# Patient Record
Sex: FEMALE | Employment: OTHER | URBAN - METROPOLITAN AREA
[De-identification: names, ages, dates, MRNs, and addresses within clinical notes are randomized per-mention and may not be internally consistent; named-entity substitution may affect disease eponyms.]

---

## 2017-04-28 ENCOUNTER — OFFICE VISIT - HEALTHEAST (OUTPATIENT)
Dept: FAMILY MEDICINE | Facility: CLINIC | Age: 32
End: 2017-04-28

## 2017-04-28 DIAGNOSIS — Z31.9 DESIRE FOR PREGNANCY: ICD-10-CM

## 2017-04-28 DIAGNOSIS — F34.1 DYSTHYMIC DISORDER: ICD-10-CM

## 2017-04-28 ASSESSMENT — MIFFLIN-ST. JEOR: SCORE: 1566.11

## 2017-05-23 ENCOUNTER — OFFICE VISIT - HEALTHEAST (OUTPATIENT)
Dept: OBGYN | Facility: CLINIC | Age: 32
End: 2017-05-23

## 2017-05-23 DIAGNOSIS — Z31.49 PROCREATION MANAGEMENT INVESTIGATION AND TESTING: ICD-10-CM

## 2017-06-21 ENCOUNTER — COMMUNICATION - HEALTHEAST (OUTPATIENT)
Dept: FAMILY MEDICINE | Facility: CLINIC | Age: 32
End: 2017-06-21

## 2017-06-21 DIAGNOSIS — F32.A ANXIETY AND DEPRESSION: ICD-10-CM

## 2017-06-21 DIAGNOSIS — F41.9 ANXIETY AND DEPRESSION: ICD-10-CM

## 2017-09-21 ENCOUNTER — COMMUNICATION - HEALTHEAST (OUTPATIENT)
Dept: FAMILY MEDICINE | Facility: CLINIC | Age: 32
End: 2017-09-21

## 2017-09-21 DIAGNOSIS — F32.A ANXIETY AND DEPRESSION: ICD-10-CM

## 2017-09-21 DIAGNOSIS — F41.9 ANXIETY AND DEPRESSION: ICD-10-CM

## 2017-10-19 ENCOUNTER — COMMUNICATION - HEALTHEAST (OUTPATIENT)
Dept: FAMILY MEDICINE | Facility: CLINIC | Age: 32
End: 2017-10-19

## 2017-10-19 DIAGNOSIS — F34.1 DYSTHYMIC DISORDER: ICD-10-CM

## 2018-04-20 ENCOUNTER — COMMUNICATION - HEALTHEAST (OUTPATIENT)
Dept: FAMILY MEDICINE | Facility: CLINIC | Age: 33
End: 2018-04-20

## 2018-04-20 DIAGNOSIS — F34.1 DYSTHYMIC DISORDER: ICD-10-CM

## 2018-07-18 ENCOUNTER — COMMUNICATION - HEALTHEAST (OUTPATIENT)
Dept: FAMILY MEDICINE | Facility: CLINIC | Age: 33
End: 2018-07-18

## 2018-07-18 DIAGNOSIS — F34.1 DYSTHYMIC DISORDER: ICD-10-CM

## 2018-07-24 ENCOUNTER — AMBULATORY - HEALTHEAST (OUTPATIENT)
Dept: FAMILY MEDICINE | Facility: CLINIC | Age: 33
End: 2018-07-24

## 2018-07-24 ENCOUNTER — COMMUNICATION - HEALTHEAST (OUTPATIENT)
Dept: FAMILY MEDICINE | Facility: CLINIC | Age: 33
End: 2018-07-24

## 2018-07-24 DIAGNOSIS — N92.6 IRREGULAR PERIODS: ICD-10-CM

## 2018-08-28 ENCOUNTER — OFFICE VISIT - HEALTHEAST (OUTPATIENT)
Dept: OBGYN | Facility: CLINIC | Age: 33
End: 2018-08-28

## 2018-08-28 DIAGNOSIS — Z31.49 PROCREATION MANAGEMENT INVESTIGATION AND TESTING: ICD-10-CM

## 2018-08-28 DIAGNOSIS — N94.6 DYSMENORRHEA: ICD-10-CM

## 2018-08-28 ASSESSMENT — MIFFLIN-ST. JEOR: SCORE: 1614.87

## 2018-08-29 ENCOUNTER — COMMUNICATION - HEALTHEAST (OUTPATIENT)
Dept: FAMILY MEDICINE | Facility: CLINIC | Age: 33
End: 2018-08-29

## 2018-09-07 ENCOUNTER — AMBULATORY - HEALTHEAST (OUTPATIENT)
Dept: LAB | Facility: CLINIC | Age: 33
End: 2018-09-07

## 2018-09-07 DIAGNOSIS — Z31.49 PROCREATION MANAGEMENT INVESTIGATION AND TESTING: ICD-10-CM

## 2018-09-07 LAB — PROGEST SERPL-MCNC: 7.4 NG/ML

## 2018-09-12 ENCOUNTER — COMMUNICATION - HEALTHEAST (OUTPATIENT)
Dept: OBGYN | Facility: CLINIC | Age: 33
End: 2018-09-12

## 2018-10-03 ENCOUNTER — OFFICE VISIT - HEALTHEAST (OUTPATIENT)
Dept: FAMILY MEDICINE | Facility: CLINIC | Age: 33
End: 2018-10-03

## 2018-10-03 DIAGNOSIS — F34.1 DYSTHYMIC DISORDER: ICD-10-CM

## 2018-10-03 DIAGNOSIS — L72.3 SEBACEOUS CYST: ICD-10-CM

## 2018-10-03 DIAGNOSIS — R63.5 WEIGHT GAIN: ICD-10-CM

## 2018-10-03 DIAGNOSIS — Z00.00 ROUTINE GENERAL MEDICAL EXAMINATION AT A HEALTH CARE FACILITY: ICD-10-CM

## 2018-10-03 LAB
ERYTHROCYTE [DISTWIDTH] IN BLOOD BY AUTOMATED COUNT: 12.3 % (ref 11–14.5)
HCT VFR BLD AUTO: 42.7 % (ref 35–47)
HGB BLD-MCNC: 14.6 G/DL (ref 12–16)
MCH RBC QN AUTO: 30.9 PG (ref 27–34)
MCHC RBC AUTO-ENTMCNC: 34.1 G/DL (ref 32–36)
MCV RBC AUTO: 90 FL (ref 80–100)
PLATELET # BLD AUTO: 263 THOU/UL (ref 140–440)
PMV BLD AUTO: 7.3 FL (ref 7–10)
RBC # BLD AUTO: 4.72 MILL/UL (ref 3.8–5.4)
WBC: 8.3 THOU/UL (ref 4–11)

## 2018-10-03 ASSESSMENT — MIFFLIN-ST. JEOR: SCORE: 1589.36

## 2018-10-04 LAB
ALBUMIN SERPL-MCNC: 4.4 G/DL (ref 3.5–5)
ALP SERPL-CCNC: 62 U/L (ref 45–120)
ALT SERPL W P-5'-P-CCNC: 13 U/L (ref 0–45)
ANION GAP SERPL CALCULATED.3IONS-SCNC: 11 MMOL/L (ref 5–18)
AST SERPL W P-5'-P-CCNC: 12 U/L (ref 0–40)
BILIRUB SERPL-MCNC: 0.6 MG/DL (ref 0–1)
BUN SERPL-MCNC: 11 MG/DL (ref 8–22)
CALCIUM SERPL-MCNC: 10 MG/DL (ref 8.5–10.5)
CHLORIDE BLD-SCNC: 105 MMOL/L (ref 98–107)
CHOLEST SERPL-MCNC: 161 MG/DL
CO2 SERPL-SCNC: 23 MMOL/L (ref 22–31)
CREAT SERPL-MCNC: 0.75 MG/DL (ref 0.6–1.1)
FASTING STATUS PATIENT QL REPORTED: NO
GFR SERPL CREATININE-BSD FRML MDRD: >60 ML/MIN/1.73M2
GLUCOSE BLD-MCNC: 91 MG/DL (ref 70–125)
HDLC SERPL-MCNC: 58 MG/DL
LDLC SERPL CALC-MCNC: 93 MG/DL
POTASSIUM BLD-SCNC: 4.3 MMOL/L (ref 3.5–5)
PROT SERPL-MCNC: 7.7 G/DL (ref 6–8)
SODIUM SERPL-SCNC: 139 MMOL/L (ref 136–145)
TRIGL SERPL-MCNC: 51 MG/DL
TSH SERPL DL<=0.005 MIU/L-ACNC: 1.57 UIU/ML (ref 0.3–5)

## 2018-10-05 LAB
HPV SOURCE: NORMAL
HUMAN PAPILLOMA VIRUS 16 DNA: NEGATIVE
HUMAN PAPILLOMA VIRUS 18 DNA: NEGATIVE
HUMAN PAPILLOMA VIRUS FINAL DIAGNOSIS: NORMAL
HUMAN PAPILLOMA VIRUS OTHER HR: NEGATIVE
SPECIMEN DESCRIPTION: NORMAL

## 2018-10-08 ENCOUNTER — RECORDS - HEALTHEAST (OUTPATIENT)
Dept: ADMINISTRATIVE | Facility: OTHER | Age: 33
End: 2018-10-08

## 2018-10-11 LAB
BKR LAB AP ABNORMAL BLEEDING: YES
BKR LAB AP BIRTH CONTROL/HORMONES: NORMAL
BKR LAB AP CERVICAL APPEARANCE: NORMAL
BKR LAB AP GYN ADEQUACY: NORMAL
BKR LAB AP GYN INTERPRETATION: NORMAL
BKR LAB AP HPV REFLEX: NORMAL
BKR LAB AP LMP: NORMAL
BKR LAB AP PATIENT STATUS: NORMAL
BKR LAB AP PREVIOUS ABNORMAL: NO
BKR LAB AP PREVIOUS NORMAL: 2014
HIGH RISK?: NO
PATH REPORT.COMMENTS IMP SPEC: NORMAL
RESULT FLAG (HE HISTORICAL CONVERSION): NORMAL

## 2018-10-15 ENCOUNTER — COMMUNICATION - HEALTHEAST (OUTPATIENT)
Dept: FAMILY MEDICINE | Facility: CLINIC | Age: 33
End: 2018-10-15

## 2018-10-15 DIAGNOSIS — F34.1 DYSTHYMIC DISORDER: ICD-10-CM

## 2018-11-09 ENCOUNTER — RECORDS - HEALTHEAST (OUTPATIENT)
Dept: ADMINISTRATIVE | Facility: OTHER | Age: 33
End: 2018-11-09

## 2018-11-12 ENCOUNTER — HOSPITAL ENCOUNTER (OUTPATIENT)
Dept: RADIOLOGY | Facility: HOSPITAL | Age: 33
Discharge: HOME | End: 2018-11-12
Attending: OBSTETRICS & GYNECOLOGY | Admitting: RADIOLOGY

## 2018-11-12 DIAGNOSIS — R10.2 PELVIC PAIN: ICD-10-CM

## 2018-11-12 DIAGNOSIS — Z31.69 INFERTILITY COUNSELING: ICD-10-CM

## 2019-01-14 ENCOUNTER — RECORDS - HEALTHEAST (OUTPATIENT)
Dept: ADMINISTRATIVE | Facility: OTHER | Age: 34
End: 2019-01-14

## 2019-01-17 ENCOUNTER — OFFICE VISIT - HEALTHEAST (OUTPATIENT)
Dept: FAMILY MEDICINE | Facility: CLINIC | Age: 34
End: 2019-01-17

## 2019-01-17 DIAGNOSIS — S16.1XXA STRAIN OF NECK MUSCLE, INITIAL ENCOUNTER: ICD-10-CM

## 2019-01-17 DIAGNOSIS — S06.0X0D CONCUSSION WITHOUT LOSS OF CONSCIOUSNESS, SUBSEQUENT ENCOUNTER: ICD-10-CM

## 2019-09-09 ENCOUNTER — OFFICE VISIT - HEALTHEAST (OUTPATIENT)
Dept: FAMILY MEDICINE | Facility: CLINIC | Age: 34
End: 2019-09-09

## 2019-09-09 DIAGNOSIS — Z00.00 ROUTINE GENERAL MEDICAL EXAMINATION AT A HEALTH CARE FACILITY: ICD-10-CM

## 2019-09-09 DIAGNOSIS — D22.9 ATYPICAL NEVI: ICD-10-CM

## 2019-09-09 DIAGNOSIS — F32.A DEPRESSION, UNSPECIFIED DEPRESSION TYPE: ICD-10-CM

## 2019-09-09 DIAGNOSIS — N97.9 INFERTILITY, FEMALE: ICD-10-CM

## 2019-09-09 DIAGNOSIS — F34.1 DYSTHYMIC DISORDER: ICD-10-CM

## 2019-09-09 LAB
ALBUMIN SERPL-MCNC: 4.1 G/DL (ref 3.5–5)
ALP SERPL-CCNC: 58 U/L (ref 45–120)
ALT SERPL W P-5'-P-CCNC: 9 U/L (ref 0–45)
ANION GAP SERPL CALCULATED.3IONS-SCNC: 10 MMOL/L (ref 5–18)
AST SERPL W P-5'-P-CCNC: 12 U/L (ref 0–40)
BILIRUB SERPL-MCNC: 0.7 MG/DL (ref 0–1)
BUN SERPL-MCNC: 12 MG/DL (ref 8–22)
CALCIUM SERPL-MCNC: 9.4 MG/DL (ref 8.5–10.5)
CHLORIDE BLD-SCNC: 106 MMOL/L (ref 98–107)
CHOLEST SERPL-MCNC: 159 MG/DL
CO2 SERPL-SCNC: 24 MMOL/L (ref 22–31)
CREAT SERPL-MCNC: 0.81 MG/DL (ref 0.6–1.1)
ERYTHROCYTE [DISTWIDTH] IN BLOOD BY AUTOMATED COUNT: 12.3 % (ref 11–14.5)
FASTING STATUS PATIENT QL REPORTED: YES
GFR SERPL CREATININE-BSD FRML MDRD: >60 ML/MIN/1.73M2
GLUCOSE BLD-MCNC: 88 MG/DL (ref 70–125)
HCT VFR BLD AUTO: 40.7 % (ref 35–47)
HDLC SERPL-MCNC: 55 MG/DL
HGB BLD-MCNC: 13.8 G/DL (ref 12–16)
LDLC SERPL CALC-MCNC: 88 MG/DL
MCH RBC QN AUTO: 30.5 PG (ref 27–34)
MCHC RBC AUTO-ENTMCNC: 33.9 G/DL (ref 32–36)
MCV RBC AUTO: 90 FL (ref 80–100)
PLATELET # BLD AUTO: 247 THOU/UL (ref 140–440)
PMV BLD AUTO: 7.4 FL (ref 7–10)
POTASSIUM BLD-SCNC: 4.4 MMOL/L (ref 3.5–5)
PROT SERPL-MCNC: 6.8 G/DL (ref 6–8)
RBC # BLD AUTO: 4.52 MILL/UL (ref 3.8–5.4)
SODIUM SERPL-SCNC: 140 MMOL/L (ref 136–145)
TRIGL SERPL-MCNC: 79 MG/DL
TSH SERPL DL<=0.005 MIU/L-ACNC: 1.49 UIU/ML (ref 0.3–5)
WBC: 5.2 THOU/UL (ref 4–11)

## 2019-09-09 ASSESSMENT — ANXIETY QUESTIONNAIRES
GAD7 TOTAL SCORE: 2
6. BECOMING EASILY ANNOYED OR IRRITABLE: SEVERAL DAYS
7. FEELING AFRAID AS IF SOMETHING AWFUL MIGHT HAPPEN: NOT AT ALL
5. BEING SO RESTLESS THAT IT IS HARD TO SIT STILL: NOT AT ALL
IF YOU CHECKED OFF ANY PROBLEMS ON THIS QUESTIONNAIRE, HOW DIFFICULT HAVE THESE PROBLEMS MADE IT FOR YOU TO DO YOUR WORK, TAKE CARE OF THINGS AT HOME, OR GET ALONG WITH OTHER PEOPLE: NOT DIFFICULT AT ALL
4. TROUBLE RELAXING: NOT AT ALL
1. FEELING NERVOUS, ANXIOUS, OR ON EDGE: NOT AT ALL
3. WORRYING TOO MUCH ABOUT DIFFERENT THINGS: SEVERAL DAYS
2. NOT BEING ABLE TO STOP OR CONTROL WORRYING: NOT AT ALL

## 2019-09-09 ASSESSMENT — PATIENT HEALTH QUESTIONNAIRE - PHQ9: SUM OF ALL RESPONSES TO PHQ QUESTIONS 1-9: 2

## 2019-09-09 ASSESSMENT — MIFFLIN-ST. JEOR: SCORE: 1601.04

## 2019-09-10 LAB
25(OH)D3 SERPL-MCNC: 34.7 NG/ML (ref 30–80)
25(OH)D3 SERPL-MCNC: 34.7 NG/ML (ref 30–80)

## 2019-09-19 ENCOUNTER — RECORDS - HEALTHEAST (OUTPATIENT)
Dept: ADMINISTRATIVE | Facility: OTHER | Age: 34
End: 2019-09-19

## 2019-10-03 ENCOUNTER — AMBULATORY - HEALTHEAST (OUTPATIENT)
Dept: NURSING | Facility: CLINIC | Age: 34
End: 2019-10-03

## 2019-10-03 DIAGNOSIS — Z23 NEED FOR VACCINATION: ICD-10-CM

## 2019-10-12 ENCOUNTER — COMMUNICATION - HEALTHEAST (OUTPATIENT)
Dept: FAMILY MEDICINE | Facility: CLINIC | Age: 34
End: 2019-10-12

## 2019-10-12 DIAGNOSIS — F34.1 DYSTHYMIC DISORDER: ICD-10-CM

## 2020-02-22 ENCOUNTER — COMMUNICATION - HEALTHEAST (OUTPATIENT)
Dept: FAMILY MEDICINE | Facility: CLINIC | Age: 35
End: 2020-02-22

## 2020-10-15 ENCOUNTER — COMMUNICATION - HEALTHEAST (OUTPATIENT)
Dept: FAMILY MEDICINE | Facility: CLINIC | Age: 35
End: 2020-10-15

## 2020-10-16 ENCOUNTER — COMMUNICATION - HEALTHEAST (OUTPATIENT)
Dept: FAMILY MEDICINE | Facility: CLINIC | Age: 35
End: 2020-10-16

## 2020-10-16 DIAGNOSIS — F34.1 DYSTHYMIC DISORDER: ICD-10-CM

## 2020-10-17 ENCOUNTER — COMMUNICATION - HEALTHEAST (OUTPATIENT)
Dept: FAMILY MEDICINE | Facility: CLINIC | Age: 35
End: 2020-10-17

## 2020-10-17 DIAGNOSIS — F34.1 DYSTHYMIC DISORDER: ICD-10-CM

## 2020-10-19 RX ORDER — SERTRALINE HYDROCHLORIDE 100 MG/1
100 TABLET, FILM COATED ORAL DAILY
Qty: 90 TABLET | Refills: 3 | Status: SHIPPED | OUTPATIENT
Start: 2020-10-19 | End: 2021-10-11

## 2021-01-12 ENCOUNTER — COMMUNICATION - HEALTHEAST (OUTPATIENT)
Dept: FAMILY MEDICINE | Facility: CLINIC | Age: 36
End: 2021-01-12

## 2021-01-21 ENCOUNTER — RECORDS - HEALTHEAST (OUTPATIENT)
Dept: ADMINISTRATIVE | Facility: OTHER | Age: 36
End: 2021-01-21

## 2021-01-27 ENCOUNTER — RECORDS - HEALTHEAST (OUTPATIENT)
Dept: ADMINISTRATIVE | Facility: OTHER | Age: 36
End: 2021-01-27

## 2021-02-01 ENCOUNTER — OFFICE VISIT - HEALTHEAST (OUTPATIENT)
Dept: FAMILY MEDICINE | Facility: CLINIC | Age: 36
End: 2021-02-01

## 2021-02-01 DIAGNOSIS — D22.62 MELANOCYTIC NEVUS OF LEFT UPPER EXTREMITY: ICD-10-CM

## 2021-02-01 DIAGNOSIS — Z00.00 ROUTINE GENERAL MEDICAL EXAMINATION AT A HEALTH CARE FACILITY: ICD-10-CM

## 2021-02-01 DIAGNOSIS — F34.1 DYSTHYMIC DISORDER: ICD-10-CM

## 2021-02-01 LAB
ALBUMIN SERPL-MCNC: 4.2 G/DL (ref 3.5–5)
ALP SERPL-CCNC: 52 U/L (ref 45–120)
ALT SERPL W P-5'-P-CCNC: <9 U/L (ref 0–45)
ANION GAP SERPL CALCULATED.3IONS-SCNC: 9 MMOL/L (ref 5–18)
AST SERPL W P-5'-P-CCNC: 10 U/L (ref 0–40)
BILIRUB SERPL-MCNC: 1.2 MG/DL (ref 0–1)
BUN SERPL-MCNC: 12 MG/DL (ref 8–22)
CALCIUM SERPL-MCNC: 8.8 MG/DL (ref 8.5–10.5)
CHLORIDE BLD-SCNC: 106 MMOL/L (ref 98–107)
CHOLEST SERPL-MCNC: 142 MG/DL
CO2 SERPL-SCNC: 26 MMOL/L (ref 22–31)
CREAT SERPL-MCNC: 0.81 MG/DL (ref 0.6–1.1)
ERYTHROCYTE [DISTWIDTH] IN BLOOD BY AUTOMATED COUNT: 11.7 % (ref 11–14.5)
FASTING STATUS PATIENT QL REPORTED: YES
GFR SERPL CREATININE-BSD FRML MDRD: >60 ML/MIN/1.73M2
GLUCOSE BLD-MCNC: 86 MG/DL (ref 70–125)
HCT VFR BLD AUTO: 41.3 % (ref 35–47)
HDLC SERPL-MCNC: 51 MG/DL
HGB BLD-MCNC: 13.7 G/DL (ref 12–16)
LDLC SERPL CALC-MCNC: 82 MG/DL
MCH RBC QN AUTO: 30.2 PG (ref 27–34)
MCHC RBC AUTO-ENTMCNC: 33.2 G/DL (ref 32–36)
MCV RBC AUTO: 91 FL (ref 80–100)
PLATELET # BLD AUTO: 219 THOU/UL (ref 140–440)
PMV BLD AUTO: 7 FL (ref 7–10)
POTASSIUM BLD-SCNC: 4.2 MMOL/L (ref 3.5–5)
PROT SERPL-MCNC: 6.5 G/DL (ref 6–8)
RBC # BLD AUTO: 4.55 MILL/UL (ref 3.8–5.4)
SODIUM SERPL-SCNC: 141 MMOL/L (ref 136–145)
TRIGL SERPL-MCNC: 47 MG/DL
TSH SERPL DL<=0.005 MIU/L-ACNC: 1.66 UIU/ML (ref 0.3–5)
WBC: 5.4 THOU/UL (ref 4–11)

## 2021-02-01 RX ORDER — CLINDAMYCIN PHOSPHATE 10 UG/ML
LOTION TOPICAL
Status: SHIPPED | COMMUNITY
Start: 2020-12-09

## 2021-02-01 ASSESSMENT — ANXIETY QUESTIONNAIRES
7. FEELING AFRAID AS IF SOMETHING AWFUL MIGHT HAPPEN: NOT AT ALL
GAD7 TOTAL SCORE: 5
1. FEELING NERVOUS, ANXIOUS, OR ON EDGE: SEVERAL DAYS
2. NOT BEING ABLE TO STOP OR CONTROL WORRYING: SEVERAL DAYS
4. TROUBLE RELAXING: SEVERAL DAYS
3. WORRYING TOO MUCH ABOUT DIFFERENT THINGS: SEVERAL DAYS
6. BECOMING EASILY ANNOYED OR IRRITABLE: SEVERAL DAYS
IF YOU CHECKED OFF ANY PROBLEMS ON THIS QUESTIONNAIRE, HOW DIFFICULT HAVE THESE PROBLEMS MADE IT FOR YOU TO DO YOUR WORK, TAKE CARE OF THINGS AT HOME, OR GET ALONG WITH OTHER PEOPLE: NOT DIFFICULT AT ALL
5. BEING SO RESTLESS THAT IT IS HARD TO SIT STILL: NOT AT ALL

## 2021-02-01 ASSESSMENT — MIFFLIN-ST. JEOR: SCORE: 1474.82

## 2021-02-01 ASSESSMENT — PATIENT HEALTH QUESTIONNAIRE - PHQ9: SUM OF ALL RESPONSES TO PHQ QUESTIONS 1-9: 5

## 2021-02-02 LAB
25(OH)D3 SERPL-MCNC: 46.8 NG/ML (ref 30–80)
25(OH)D3 SERPL-MCNC: 46.8 NG/ML (ref 30–80)
HPV SOURCE: NORMAL
HUMAN PAPILLOMA VIRUS 16 DNA: NEGATIVE
HUMAN PAPILLOMA VIRUS 18 DNA: NEGATIVE
HUMAN PAPILLOMA VIRUS FINAL DIAGNOSIS: NORMAL
HUMAN PAPILLOMA VIRUS OTHER HR: NEGATIVE
SPECIMEN DESCRIPTION: NORMAL

## 2021-02-09 ENCOUNTER — COMMUNICATION - HEALTHEAST (OUTPATIENT)
Dept: FAMILY MEDICINE | Facility: CLINIC | Age: 36
End: 2021-02-09

## 2021-02-26 ENCOUNTER — AMBULATORY - HEALTHEAST (OUTPATIENT)
Dept: OTHER | Facility: CLINIC | Age: 36
End: 2021-02-26

## 2021-04-28 ENCOUNTER — RECORDS - HEALTHEAST (OUTPATIENT)
Dept: ADMINISTRATIVE | Facility: OTHER | Age: 36
End: 2021-04-28

## 2021-05-25 ENCOUNTER — RECORDS - HEALTHEAST (OUTPATIENT)
Dept: ADMINISTRATIVE | Facility: CLINIC | Age: 36
End: 2021-05-25

## 2021-05-26 ENCOUNTER — RECORDS - HEALTHEAST (OUTPATIENT)
Dept: ADMINISTRATIVE | Facility: CLINIC | Age: 36
End: 2021-05-26

## 2021-05-26 ASSESSMENT — PATIENT HEALTH QUESTIONNAIRE - PHQ9: SUM OF ALL RESPONSES TO PHQ QUESTIONS 1-9: 2

## 2021-05-27 ASSESSMENT — PATIENT HEALTH QUESTIONNAIRE - PHQ9: SUM OF ALL RESPONSES TO PHQ QUESTIONS 1-9: 5

## 2021-05-28 ASSESSMENT — ANXIETY QUESTIONNAIRES
GAD7 TOTAL SCORE: 2
GAD7 TOTAL SCORE: 5

## 2021-05-30 VITALS — BODY MASS INDEX: 30.09 KG/M2 | HEIGHT: 66 IN | WEIGHT: 187.25 LBS

## 2021-05-31 VITALS — WEIGHT: 187 LBS | BODY MASS INDEX: 30.18 KG/M2

## 2021-06-01 NOTE — PATIENT INSTRUCTIONS - HE
Recommend 64-96 oz of non-caffenated fluid per day.    Hot pack if feel like ingrown hair on breast.  If any pimple or lesion on breast last 1-2 months, let us know and we will order mammogram and either Dermatology or breast surgeon consult.    For acne-We can order topical Clindamycin if wishes, can send mychart message if wishes script.    Fertility treatment if and when wishes with a specialty clinic.    Monitor cysts on head, if increasing in size or bothering you, we can remove or have you see a surgeon.    Bariatric consult if wishes for non surgical options.    Dermatology consult for full body skin check and to look at and possible biopsy of the lesion on right upper back      Health Maintenance   Topic Date Due     DEPRESSION FOLLOW UP  Discussed today     HIV SCREENING  NA     INFLUENZA VACCINE RULE BASED (1) Will get in a month or so     PREVENTIVE CARE VISIT  Yearly     TD 18+ HE  02/17/2022     PAP SMEAR  Due in 2 years     ADVANCE CARE PLANNING  On file     TDAP ADULT ONE TIME DOSE  Completed     Counseling would be a good idea.  Options below:    [facility]:   [facility] [phone]  [address] [facility], [place] [address]     [facility]: [phone]  [address], [facility] [address]    [facility]: [phone]  [address], [place] [address]    [facility]:  [phone]   [address] [place] [address]    [facility]:  [facility] [phone]   [address] [place] [address], [address]    [place]:  [facility]: [phone]  [facility] [facility]. [address].     [place]:  [facility]  [phone]  [address], [place] [address]    [place]:  [place] [facility] [phone]    [address], [facility]  [address]    [facility] /[phone]   [address] [facility] [place], [address]    [facility] [phone] [address] [facility] [address] [facility], [address]    [facility] [phone] [address]

## 2021-06-01 NOTE — PROGRESS NOTES
Assessment/Plan:  1. Routine general medical examination at a health care facility  Lipid [facility] FASTING   2. Depression, unspecified depression type  Comprehensive Metabolic Panel   3. Depression With Anxiety  HM2(CBC w/o Differential)    Thyroid Cascade    Vitamin D, Total (25-Hydroxy)   4. Atypical nevi  Ambulatory referral to Dermatology   5. Infertility, female       Recommend 64-96 oz of non-caffenated fluid per day.    Hot pack if feel like ingrown hair on breast.  If any pimple or lesion on breast last 1-2 months, let us know and we will order mammogram and either Dermatology or breast surgeon consult.    For acne-We can order topical Clindamycin if wishes, can send OncoPep message if wishes script.    Fertility treatment if and when wishes with a specialty clinic.    Monitor cysts on head, if increasing in size or bothering you, we can remove or have you see a surgeon.    Bariatric consult if wishes for non surgical options.    Dermatology consult for full body skin check and to look at and possible biopsy of the lesion on right upper back    Counseling would be a good idea.  Options given.    Patient is a 34 y.o. female here for physical exam. See health maintenance section below. Labs as ordered.        HPI    Chief Complaint   Patient presents with     Annual Exam     Fasting, no other concerns       Health Maintenance   Topic Date Due     DEPRESSION FOLLOW UP  Discussed today     HIV SCREENING  NA     INFLUENZA VACCINE RULE BASED (1) Will get in a month or so     PREVENTIVE CARE VISIT  Yearly     TD 18+ HE  02/17/2022     PAP SMEAR  Due in 2 years     ADVANCE CARE PLANNING  On file     TDAP ADULT ONE TIME DOSE  Completed        Patient Active Problem List   Diagnosis     Overweight     Depression With Anxiety     Pigmented Nevus     Dysmenorrhea     Sebaceous cyst     Infertility, female     Current Outpatient Medications   Medication Sig     sertraline (ZOLOFT) 100 MG tablet Take 1 tablet (100 mg  total) by mouth daily.     prenatal vitamin iron-folic acid 27mg-0.8mg (PRENATAL S) 27 mg iron- 800 mcg Tab tablet Take 1 tablet by mouth daily.       Patient is a 34 y.o. female presents for a physical exam.  She does have a few concerns.    Lumps on her head-patient was diagnosed with sebaceous cysts on her head last year at her physical.  She does not feel they have changed in size and do not bother her.    Depression and anxiety-she feels this is overall stable but she has had a recent job change.  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  This is exciting but also causing some anxiety for her.  She is wishing to speak with a counselor.  She is currently on sertraline.    Infertility-she and her  have been trying to conceive for 3 years.  They have seen an infertility specialist.  They have not done any intrauterine insemination or in vitro.  She has done a lot of lab work that has been normal.  Her  has had a sperm count that is normal.  She said she had a hysterosalpingogram that was normal.  She does have regular periods.  She does have history of painful menses.  She was told she possibly has endometriosis but they are not recommending an exploratory laparoscopy at this point.  She has used ovulation predictor kits.  She is on a prenatal vitamin.    Acne-patient does have some acne on her chest and back.  She is occasionally now getting some pimples or ingrown hairs on her breasts.  She is wondering if this is normal.  They do seem to go away in the normal timeframe that she would expect her acne to go away.  She has not used any prescription medication for this.  Working on weight loss-patient has been tracking calories keeping them to 1800 and day is what is recommended per her appendectomy.  She exercises 15 to 60 minutes a day plus a lot of yard work kayaking cleaning house etc.  She has lost 5 pounds  "in the last year and hoping to continue with this or possibly lose more than that.    The following portions of the patient's history were reviewed and updated as appropriate: past medical history, past social history, past surgical history and problem list.    Review of Systems  Pertinent items are noted in HPI.  Immunization History   Administered Date(s) Administered     DT (pediatric) 09/15/2006     HPV Quadrivalent 09/19/2008, 11/21/2008, 10/12/2009     Hep A, historic 09/19/2008, 03/17/2009     Hep B, historic 07/28/2000, 09/25/2000, 02/01/2001     Influenza,seasonal,quad inj =/> 6months 11/13/2015, 12/09/2016, 10/03/2018     Td,adult,historic,unspecified 09/15/2006     Tdap 02/17/2012     Recent Results (from the past 240 hour(s))   HM2(CBC w/o Differential)   Result Value Ref Range    WBC 5.2 4.0 - 11.0 thou/uL    RBC 4.52 3.80 - 5.40 mill/uL    Hemoglobin 13.8 12.0 - 16.0 g/dL    Hematocrit 40.7 35.0 - 47.0 %    MCV 90 80 - 100 fL    MCH 30.5 27.0 - 34.0 pg    MCHC 33.9 32.0 - 36.0 g/dL    RDW 12.3 11.0 - 14.5 %    Platelets 247 140 - 440 thou/uL    MPV 7.4 7.0 - 10.0 fL     I have had an Advance Directives discussion with the patient.  Objective:    BP 92/61 (Patient Site: Left Arm, Patient Position: Sitting, Cuff Size: Adult Large)   Pulse 71   Temp 98  F (36.7  C) (Oral)   Ht 5' 6.5\" (1.689 m)   Wt 193 lb 3.2 oz (87.6 kg)   LMP 09/05/2019   SpO2 97%   BMI 30.72 kg/m        General Appearance:    Alert, cooperative, no distress, appears stated age   Head:    Normocephalic, without obvious abnormality, atraumatic   Eyes:    PERRL, conjunctiva/corneas clear, EOM's intact, fundi     benign, both eyes   Ears:    Normal TM's and external ear canals, both ears   Nose:   Nares normal, septum midline, mucosa normal, no drainage    or sinus tenderness   Throat:   Lips, mucosa, and tongue normal; teeth and gums normal   Neck:   Supple, symmetrical, trachea midline, no adenopathy;     thyroid:  no " enlargement/tenderness/nodules; no carotid    bruit or JVD   Back:     Symmetric, no curvature, ROM normal, no CVA tenderness   Lungs:     Clear to auscultation bilaterally, respirations unlabored   Chest Wall:    No tenderness or deformity    Heart:    Regular rate and rhythm, S1 and S2 normal, no murmur, rub   or gallop   Breast Exam:    No tenderness, masses, or nipple abnormality   Abdomen:     Soft, non-tender, bowel sounds active all four quadrants,     no masses, no organomegaly   Genitalia:    Normal female without lesion, discharge or tenderness       Extremities:   Extremities normal, atraumatic, no cyanosis or edema   Pulses:   2+ and symmetric all extremities   Skin:   Skin color, texture, turgor normal, no rashes, some mild acne on her chest, red papule on the left breast consistent with an ingrown hair or acne, 2 mm black macule right upper back, pea-sized soft mobile nodule on the top of the scalp midline about 2 cm posterior from the hairline, pea-sized soft mobile nodule midline top of scalp about 6 cm posterior from hairline   Lymph nodes:   Cervical, supraclavicular, and axillary nodes normal   Neurologic:   CNII-XII intact, normal strength, sensation and reflexes     throughout

## 2021-06-02 VITALS — WEIGHT: 196 LBS | BODY MASS INDEX: 31.64 KG/M2

## 2021-06-02 VITALS — HEIGHT: 66 IN | BODY MASS INDEX: 30.92 KG/M2 | WEIGHT: 192.38 LBS

## 2021-06-02 VITALS — HEIGHT: 66 IN | BODY MASS INDEX: 31.82 KG/M2 | WEIGHT: 198 LBS

## 2021-06-02 NOTE — TELEPHONE ENCOUNTER
Refill Approved    Rx renewed per Medication Renewal Policy. Medication was last renewed on 10/15/2018 w/3 refills.     Last office visit: 09/09/2019 w/ Dr. Paul PCP.    Cindy Maynard, [facility] Triage/Med Refill 10/13/2019     Requested Prescriptions   Pending Prescriptions Disp Refills     sertraline (ZOLOFT) 100 MG tablet [Pharmacy Med Name: SERTRALINE 100MG TABLETS] 90 tablet 0     Sig: TAKE 1 TABLET(100 MG) BY MOUTH DAILY       SSRI Refill Protocol  Passed - 10/12/2019  7:13 AM        Passed - PCP or prescribing provider visit in last year     Last office visit with prescriber/PCP: Visit date not found OR same dept: 1/17/2019 Priyanka Oviedo FNP OR same specialty: 1/17/2019 Priyanka Oviedo FNP  Last physical: 9/9/2019 Last MTM visit: Visit date not found   Next visit within 3 mo: Visit date not found  Next physical within 3 mo: Visit date not found  Prescriber OR PCP: Fatoumata Paul MD  Last diagnosis associated with med order: 1. Depression With Anxiety  - sertraline (ZOLOFT) 100 MG tablet [Pharmacy Med Name: SERTRALINE 100MG TABLETS]; TAKE 1 TABLET(100 MG) BY MOUTH DAILY  Dispense: 90 tablet; Refill: 0    If protocol passes may refill for 12 months if within 3 months of last provider visit (or a total of 15 months).

## 2021-06-03 VITALS
WEIGHT: 193.2 LBS | HEIGHT: 67 IN | OXYGEN SATURATION: 97 % | SYSTOLIC BLOOD PRESSURE: 92 MMHG | DIASTOLIC BLOOD PRESSURE: 61 MMHG | HEART RATE: 71 BPM | TEMPERATURE: 98 F | BODY MASS INDEX: 30.32 KG/M2

## 2021-06-05 VITALS
WEIGHT: 167.13 LBS | HEIGHT: 66 IN | SYSTOLIC BLOOD PRESSURE: 100 MMHG | BODY MASS INDEX: 26.86 KG/M2 | TEMPERATURE: 96.7 F | RESPIRATION RATE: 16 BRPM | HEART RATE: 71 BPM | DIASTOLIC BLOOD PRESSURE: 62 MMHG

## 2021-06-10 NOTE — PROGRESS NOTES
"  ASSESSMENT/PLAN  1. Depression With Anxiety  Patient stable with sertraline.  Will have her continue with current dose and refill was completed today.  We do discuss risks/benefits of continuing prescription with pregnancy and she understands.    2. Desire for pregnancy  Initial labs reassuring.  Patient's partner continues with evaluation with urology.  Patient referred to Dr. Zuñiga for further consultation.  Patient will contact insurance regarding coverage for infertility treatments.  - Ambulatory referral to Obstetrics / Gynecology      Pt states an understanding and agrees to the above plan.          SUBJECTIVE:   Chief Complaint   Patient presents with     Follow-up     f/u depression and fertility     Kimberley Mendoza 31 y.o. female    Current Outpatient Prescriptions   Medication Sig Dispense Refill     prenatal vitamin iron-folic acid 27mg-0.8mg (PRENATAL S) 27 mg iron- 800 mcg Tab tablet Take 1 tablet by mouth daily.       sertraline (ZOLOFT) 100 MG tablet Take 1 tablet (100 mg total) by mouth daily. 90 tablet 1     No current facility-administered medications for this visit.      Allergies: Tetracycline   No LMP recorded.    HPI:   Randee is a 31-year-old female comes in today for follow-up regarding her depression with anxiety.  She is on sertraline at 100 mg and doing well with this.  She reports compliance with her medication and no side effects or concerns.  PHQ 9 is 2, LILIAM 7 is 1 today.  She does request refill of her medication.  Second issue that she brings up is concerned about pregnancy.  She continues to have regular menstrual cycles.  I saw her with her  in December for concerns about pregnancy and at that point they have been attempting pregnancy for 18 months.  We did some initial labs with recommendation to follow-up after her  had evaluation with urology (he has history of testicular cancer and\" low fertility\").  She has been continuing to track her cycles using an jerri and " "they are regular.  She will frequently have spotting the week prior to her menses.  Labs done in December include testosterone, CBC, thyroid, FSH, LSH all of which were normal.  She did not get a prolactin at that time.  No history of pelvic ultrasound.  Pap last done 2014.  She is due for repeat Pap now.  Randee continues on prenatal vitamin daily.    ROS: negative except as per HPI    OBJECTIVE:   The patient appears well, alert, oriented x 3, in no distress.  BP (!) 84/54 (Patient Site: Right Arm, Patient Position: Sitting, Cuff Size: Adult Regular)  Pulse 68  Temp 98.2  F (36.8  C) (Oral)   Resp 16  Ht 5' 6\" (1.676 m)  Wt 187 lb 4 oz (84.9 kg)  BMI 30.22 kg/m2    15 minutes spent face-to-face with patient, greater than 50% of this in counseling regarding the above, making plans for follow-up care, and medication management.    Pt with menses--defer exam/pap at this appt.          "

## 2021-06-10 NOTE — PROGRESS NOTES
CC: The patient is being seen as a consult from Dr Arita secondary to a desire for pregnancy.    HPI: The pt is a 31 y.o. MWF P0 who presents with a desire to conceive.  She is here with her .  She has regular periods about every 28 days.  Since going off OCPs about 2 years ago she has started to get a few days of brown spotting before her period actually starts.  She is not doing ovulation testing, but her jerri for period tracking is giving her about a 5 day window of fertile time.  During that time she does note a change in vaginal discharge to an egg white consistency.  She had day 2 labs consisting of a normal FSH, LH, testosterone free and total, and TSH.  Her  has a history of testicular cancer.  He has had semen analyses that have been abnormal in the past, with the most recent being about 6 months ago.  His counts have been low with increased abnormal forms and decreased motility.  He has a semen analysis coming up and then an appointment with Urology.    Past Medical History:   Diagnosis Date     Anxiety      Depression        Past Surgical History:   Procedure Laterality Date     NO PAST SURGERIES         Patient's   Family History   Problem Relation Age of Onset     Depression Father      Asthma Father      Thyroid disease Paternal Grandmother      Depression Paternal Grandmother      Cancer Paternal Grandfather      leukemia, bladder       Patient   Social History     Social History     Marital status: Single     Spouse name: N/A     Number of children: N/A     Years of education: N/A     Social History Main Topics     Smoking status: Never Smoker     Smokeless tobacco: None     Alcohol use None     Drug use: None     Sexual activity: Yes     Partners: Male     Birth control/ protection: None     Other Topics Concern     None     Social History Narrative       Current Outpatient Prescriptions   Medication Sig Dispense Refill     prenatal vitamin iron-folic acid 27mg-0.8mg (PRENATAL S) 27  mg iron- 800 mcg Tab tablet Take 1 tablet by mouth daily.       sertraline (ZOLOFT) 100 MG tablet Take 1 tablet (100 mg total) by mouth daily. 90 tablet 1     No current facility-administered medications for this visit.        Patient is allergic to tetracycline.    ROS:  12 part ROS is negative aside from those symptoms in the HPI    PE:  BP 90/60 (Patient Site: Left Arm, Patient Position: Sitting, Cuff Size: Adult Regular)  Pulse (!) 58  Wt 187 lb (84.8 kg)  LMP 05/21/2017  SpO2 99%  BMI 30.18 kg/m2          Body mass index is 30.18 kg/(m^2).    General: obese WF, NAD  Psych: normal mood  Neuro: CN I-XII grossly intact  MS: normal gait    Assessment: 31 y.o. MWF P0 with likely male factor infertility.    Plan: Natural history of fertility issues discussed with the patient.  We discussed that more than likely the issue is with her  because of his history.  I did order a progesterone level to complete her lab work up.  Depending on what Urology has to say, we discussed that they may recommend inseminations or Clomid for her .  We also discussed that if inseminations are recommended, that is usually done by [facility].  They may also recommend Clomid for her to really regulate her cycle for ease in timing the inseminations.  That will be determined once the Urology work up is done.  She was counseled on how to time the progesterone level.  She will be notified when the results are back; she was also counseled that to fully interpret the level, I need to know when her period after the progesterone level is drawn occurs.    Approximately 30 minutes were spent with the patient with the majority in counseling.

## 2021-06-12 NOTE — TELEPHONE ENCOUNTER
RN cannot approve Refill Request    RN can NOT refill this medication Protocol failed and NO refill given. Last office visit: Visit date not found Last Physical: 9/9/2019 Last MTM visit: Visit date not found Last visit same specialty: 1/17/2019 Priyanka Oviedo FNP.  Next visit within 3 mo: Visit date not found  Next physical within 3 mo: Visit date not found      Jaleesa Mcdaniels, [facility] Triage/Med Refill 10/18/2020    Requested Prescriptions   Pending Prescriptions Disp Refills     sertraline (ZOLOFT) 100 MG tablet [Pharmacy Med Name: SERTRALINE 100MG TABLETS] 90 tablet 3     Sig: TAKE 1 TABLET(100 MG) BY MOUTH DAILY       SSRI Refill Protocol  Failed - 10/17/2020  3:23 AM        Failed - PCP or prescribing provider visit in last year     Last office visit with prescriber/PCP: Visit date not found OR same dept: Visit date not found OR same specialty: 1/17/2019 Priyanka Oviedo FNP  Last physical: 9/9/2019 Last MTM visit: Visit date not found   Next visit within 3 mo: Visit date not found  Next physical within 3 mo: Visit date not found  Prescriber OR PCP: Fatoumata Paul MD  Last diagnosis associated with med order: 1. Depression With Anxiety  - sertraline (ZOLOFT) 100 MG tablet [Pharmacy Med Name: SERTRALINE 100MG TABLETS]; TAKE 1 TABLET(100 MG) BY MOUTH DAILY  Dispense: 90 tablet; Refill: 3    If protocol passes may refill for 12 months if within 3 months of last provider visit (or a total of 15 months).

## 2021-06-12 NOTE — TELEPHONE ENCOUNTER
RN cannot approve Refill Request    RN can NOT refill this medication PCP messaged that patient is overdue for Office Visit. Last office visit: Visit date not found Last Physical: 9/9/2019 Last MTM visit: Visit date not found Last visit same specialty: 1/17/2019 Priyanka Oviedo FNP.  Next visit within 3 mo: Visit date not found  Next physical within 3 mo: Visit date not found      Lola Casas, [facility] Triage/Med Refill 10/18/2020    Requested Prescriptions   Pending Prescriptions Disp Refills     sertraline (ZOLOFT) 100 MG tablet 90 tablet 3     Sig: Take 1 tablet (100 mg total) by mouth daily.       SSRI Refill Protocol  Failed - 10/16/2020  2:24 PM        Failed - PCP or prescribing provider visit in last year     Last office visit with prescriber/PCP: Visit date not found OR same dept: Visit date not found OR same specialty: 1/17/2019 Priyanka Oviedo FNP  Last physical: 9/9/2019 Last MTM visit: Visit date not found   Next visit within 3 mo: Visit date not found  Next physical within 3 mo: Visit date not found  Prescriber OR PCP: Fatoumata Paul MD  Last diagnosis associated with med order: 1. Depression With Anxiety  - sertraline (ZOLOFT) 100 MG tablet; Take 1 tablet (100 mg total) by mouth daily.  Dispense: 90 tablet; Refill: 3    If protocol passes may refill for 12 months if within 3 months of last provider visit (or a total of 15 months).

## 2021-06-12 NOTE — TELEPHONE ENCOUNTER
Patient state she scheduled appointment with PCP on 02/01/2021 she does not have refills on prescription requesting to send refill .  Refill Request  Did you contact pharmacy: No  Medication name:   Requested Prescriptions     Pending Prescriptions Disp Refills     sertraline (ZOLOFT) 100 MG tablet 90 tablet 3     Sig: Take 1 tablet (100 mg total) by mouth daily.   Who prescribed the medication: Fatoumata Paul MD  Requested Pharmacy: [facility]  Is patient out of medication: No.  3 days left  Patient notified refills processed in 3 business days:  yes  Okay to leave a detailed message: no

## 2021-06-14 NOTE — TELEPHONE ENCOUNTER
Informed patient's  Jordan to bring patient to [facility]'s ED per Dr. Nuno.  Jordan voiced understanding.  Will leave message for Dr. Paul to review tomorrow.

## 2021-06-14 NOTE — PROGRESS NOTES
Assessment/Plan:  1. Routine general medical examination at a health care facility  Gynecologic Cytology (PAP Smear)    Lipid Cascade FASTING   2. Melanocytic nevus of left upper extremity     3. Depression With Anxiety  Comprehensive Metabolic Panel    HM2(CBC w/o Differential)    Thyroid Cascade    Vitamin D, Total (25-Hydroxy)     Sees Dermatology for full body skin checks.    Seeing a couselior, Eri Telleskins    Continue sertraline at current dose.    Great job with healthy diet exercise and weight loss!    Recommend 3 dairy servings a day or calcium with vitamin D twice a day with food.    Mammogram will be at age 41.  Some insurances cover a baseline between 35 and 40 and is not a bad idea to check on that.    Colonoscopy for screening will be at age 45 at any time if you have blood in the stool.  If you do at age 40 if there is a family history of colon cancer or colon polyps.    20 minutes spent with this patient did not discussion regarding her breakdown and depression and anxiety.  This was on top of the time it took to do the history and physical.    Patient is a 35 y.o. female here for physical exam. See health maintenance section below. Labs as ordered.        HPI    Chief Complaint   Patient presents with     Annual Exam     pt is fasting     Depression /  anxiety:  Got a call that grandfather being released from hospital and they needed her to stay with him and take care of him.  When helping her grandfather on hospice, went 5 days without sleep, had a severe breakdown.    She was in charge of all communication for the entire family.  Per the notes in the chart patient was frantic and not doing well.  They did contact her mental health provider who recommended ensure she is safe and getting enough sleep.Per patient, she slept and much better.  Seeing a counselor and on Sertraline 100 mg and felling ok.  No suicidal or homicidal ideation.  No diagnosis of bipolar.     Weight:  Doing Noom and trying to  eat healthy and exercise.    Infertility:  Not wishing to see an infertility clinic.  Testing normal so far.    Melanocytic Nevus:  See Dermatology yearly.    Social:  Starting a new job soon.    Health Maintenance   Topic Date Due     DEPRESSION ACTION PLAN  Today     HEPATITIS C SCREENING  NA     HIV SCREENING  NA     PREVENTIVE CARE VISIT  Today     TD 18+ HE  02/17/2022     PAP SMEAR  Today     HPV TEST  Today     ADVANCE CARE PLANNING  Packet given     HEPATITIS B VACCINES  Completed     INFLUENZA VACCINE RULE BASED  Completed     TDAP ADULT ONE TIME DOSE  Completed     Pneumococcal Vaccine: Pediatrics (0 to 5 Years) and At-Risk Patients (6 to 64 Years)  NA        Patient Active Problem List   Diagnosis     Overweight     Depression With Anxiety     Pigmented Nevus     Dysmenorrhea     Sebaceous cyst     Infertility, female     Current Outpatient Medications   Medication Sig     cholecalciferol, vitamin D3, (VITAMIN D3) 25 mcg (1,000 unit) capsule Take 1,000 Units by mouth daily.     clindamycin (CLEOCIN T) 1 % lotion      metroNIDAZOLE (METROCREAM) 0.75 % cream      sertraline (ZOLOFT) 100 MG tablet Take 1 tablet (100 mg total) by mouth daily.     sertraline (ZOLOFT) 100 MG tablet TAKE 1 TABLET(100 MG) BY MOUTH DAILY       Patient is a 35 y.o. female presents for a physical exam.    The following portions of the patient's history were reviewed and updated as appropriate: past medical history, past social history, past surgical history and problem list.    Review of Systems  Pertinent items are noted in HPI.  Immunization History   Administered Date(s) Administered     DT (pediatric) 09/15/2006     HPV Quadrivalent 09/19/2008, 11/21/2008, 10/12/2009     Hep A, historic 09/19/2008, 03/17/2009     Hep B, historic 07/28/2000, 09/25/2000, 02/01/2001     Influenza, inj, historic,unspecified 10/24/2020     Influenza,seasonal,quad inj =/> 6months 11/13/2015, 12/09/2016, 10/03/2018, 10/03/2019      "Td,adult,historic,unspecified 09/15/2006     Tdap 02/17/2012     No results found for this or any previous visit (from the past 240 hour(s)).  I have had an Advance Directives discussion with the patient.  Objective:    /62 (Patient Site: Left Arm, Patient Position: Sitting, Cuff Size: Adult Regular)   Pulse 71   Temp 96.7  F (35.9  C) (Oral)   Resp 16   Ht 5' 6\" (1.676 m)   Wt 167 lb 2 oz (75.8 kg)   BMI 26.97 kg/m        General Appearance:    Alert, cooperative, no distress, appears stated age   Head:    Normocephalic, without obvious abnormality, atraumatic   Eyes:    PERRL, conjunctiva/corneas clear, EOM's intact, fundi     benign, both eyes   Ears:    Normal TM's and external ear canals, both ears   Nose:   Nares normal, septum midline, mucosa normal, no drainage    or sinus tenderness   Throat:   Lips, mucosa, and tongue normal; teeth and gums normal   Neck:   Supple, symmetrical, trachea midline, no adenopathy;     thyroid:  no enlargement/tenderness/nodules; no carotid    bruit or JVD   Back:     Symmetric, no curvature, ROM normal, no CVA tenderness   Lungs:     Clear to auscultation bilaterally, respirations unlabored   Chest Wall:    No tenderness or deformity    Heart:    Regular rate and rhythm, S1 and S2 normal, no murmur, rub   or gallop   Breast Exam:    No tenderness, masses, or nipple abnormality   Abdomen:     Soft, non-tender, bowel sounds active all four quadrants,     no masses, no organomegaly   Genitalia:    Normal female without lesion, discharge or tenderness   Rectal:    Normal tone, no masses or tenderness; guaiac negative stool   Extremities:   Extremities normal, atraumatic, no cyanosis or edema   Pulses:   2+ and symmetric all extremities   Skin:   Skin color, texture, turgor normal, no rashes or lesions   Lymph nodes:   Cervical, supraclavicular, and axillary nodes normal   Neurologic:   CNII-XII intact, normal strength, sensation and reflexes     throughout          "

## 2021-06-14 NOTE — TELEPHONE ENCOUNTER
Junaid called back states spoke with  who stated ok to wait on ED visit at this time.  Junaid stated that Kimberley is currently believing in previous delusions and is currently resting

## 2021-06-14 NOTE — PATIENT INSTRUCTIONS - HE
Sees Dermatology for full body skin checks.    Seeing a couselior, Eri Kessler    Great job with healthy diet exercise and weight loss!    Recommend 3 dairy servings a day or calcium with vitamin D twice a day with food.    Mammogram will be at age 41.  Some insurances cover a baseline between 35 and 40 and is not a bad idea to check on that.    Colonoscopy for screening will be at age 45 at any time if you have blood in the stool.  If you do at age 40 if there is a family history of colon cancer or colon polyps.      Health Maintenance   Topic Date Due     DEPRESSION ACTION PLAN  Today     HEPATITIS C SCREENING  NA     HIV SCREENING  NA     PREVENTIVE CARE VISIT  Today     TD 18+ HE  02/17/2022     PAP SMEAR  Today     HPV TEST  Today     ADVANCE CARE PLANNING  Packet given     HEPATITIS B VACCINES  Completed     INFLUENZA VACCINE RULE BASED  Completed     TDAP ADULT ONE TIME DOSE  Completed     Pneumococcal Vaccine: Pediatrics (0 to 5 Years) and At-Risk Patients (6 to 64 Years)  NA

## 2021-06-14 NOTE — TELEPHONE ENCOUNTER
"Jordan states the past week patient has been taking care of her grandfather who is on hospice.  Patient has not been not sleeping well and starting to have delusions.  Pt was hysterical yesterday and was adamant she was going to die from a car crash. Pt was able to sleep for 12 hours last night but is still saying random phrases to her  that doesn't make any sense: \"heres a watch you know what to do\". \"I'm going to die.\"  Jordan contacted patient's therapist to try and get patient scheduled.  Pt is currently taking Sertraline 100 mg daily.  Jordan wondering what Dr. Paul's recommendations are.  "

## 2021-06-14 NOTE — TELEPHONE ENCOUNTER
"Cystoscopy  Date/Time: 1/14/2020 8:30 AM  Performed by: Ge Ramon MD  Authorized by: Ge Ramon MD     Consent Done?:  Yes (Written)  Time out: Immediately prior to procedure a "time out" was called to verify the correct patient, procedure, equipment, support staff and site/side marked as required.    Indications: recurrent UTIs and BPH    Position:  Supine  Anesthesia:  Intraurethral instillation  Patient sedated?: No    Preparation: Patient was prepped and draped in usual sterile fashion      Scope type:  Flexible cystoscope  Stent inserted: No    Stent removed: No    External exam normal: Yes    Digital exam performed: No    Urethra normal: Yes    Prostate normal: No          Hyperplasia (Trilobar)(Length (cm):  8)Bladder neck normal: Bladder neck normal   Bladder normal: Yes      Patient tolerance:  Patient tolerated the procedure well with no immediate complications      " Left message for Jordan to follow up and see if they are needing anything else. I do not see an ER visit unless she went somewhere outside our system

## 2021-06-14 NOTE — TELEPHONE ENCOUNTER
I absolutely agree with the emergency room.  Please let me know after the emergency room visit if they are needing a follow-up.  Thank you.

## 2021-06-14 NOTE — TELEPHONE ENCOUNTER
Received a call from patient's spouse, Jordan to to report that patient is was out of sort the last couple days, but didn't really noticed patient's change in behavior until last night. Spouse reported that patient is sleeping/resting right now, but would like to discuss with provider or provider's nurse. He was very brief didn't elaborate much and was kind of whispering so couldn't gather further info. Communicated to him, I'll send the message to the provider's team. He would like a call back at [phone].

## 2021-06-16 PROBLEM — N97.9 INFERTILITY, FEMALE: Status: ACTIVE | Noted: 2019-09-09

## 2021-06-16 PROBLEM — D22.62 MELANOCYTIC NEVUS OF LEFT UPPER EXTREMITY: Status: ACTIVE | Noted: 2021-02-01

## 2021-06-16 PROBLEM — L72.3 SEBACEOUS CYST: Status: ACTIVE | Noted: 2018-10-03

## 2021-06-20 NOTE — PROGRESS NOTES
Assessment/Plan:  1. Routine general medical examination at a health care facility  Gynecologic Cytology (PAP Smear)    Influenza, Seasonal,Quad Inj, 36+ MOS   2. Weight gain  Comprehensive Metabolic Panel    HM2(CBC w/o Differential)    Lipid Cascade    Thyroid [place]   3. Depression With Anxiety     4. Sebaceous cyst       Patient is a 33 y.o. female here for physical exam. See health maintenance section below. Labs as ordered.      Seeing OBGYN on Friday.    Recommend 64-96 oz of non-caffenated fluid per day.    Monitor cysts on head, if increasing in size or bothering you, we can remove or have you see a surgeon.    Dermatology consult if you wish for full body skin check.    Want 0982-3396 calories per day if able or wishing to.    Exercise 30 min most days of the week.    Recommend 3 dairy servings a day.    Monitor pimple/cyst on left neck for changes, we can remove if wish.    HPI    Chief Complaint   Patient presents with     Annual Exam     physical w/ pap     Establish Care       Health Maintenance   Topic Date Due     DEPRESSION FOLLOW UP  Today     ADVANCE DIRECTIVES DISCUSSED WITH PATIENT  On file     INFLUENZA VACCINE RULE BASED (1) Today     PAP SMEAR  Today     TD 18+ HE  02/17/2022     TDAP ADULT ONE TIME DOSE  Completed        Patient Active Problem List   Diagnosis     Overweight     Depression With Anxiety     Pigmented Nevus     Dysmenorrhea     Current Outpatient Prescriptions   Medication Sig     prenatal vitamin iron-folic acid 27mg-0.8mg (PRENATAL S) 27 mg iron- 800 mcg Tab tablet Take 1 tablet by mouth daily.     sertraline (ZOLOFT) 100 MG tablet Take 1 tablet (100 mg total) by mouth daily.       Patient is a 33 y.o. female presents for a physical exam.  Patient is transferring care for primary care.  She has loved to Dr. Arita's care but is a bit worried about access.  He said Dr. Arita knows her very well and she is a little nervous to change positions but willing to do that.  She  and her  have been trying to conceive for 3 years.  This is a very sensitive topic for her.  She is very upset recently when somebody told her she should just try harder.  She recently did see Dr. Zuñiga and had a normal progesterone level.  She will be having a gynecology consult this [facility].  She feels a lot of stressors in her life.  Her cousin recently had surgery for gallstones.  She recently had jury duty.  Her company has grown and she has more work stress.  She is done some counseling in the past but did not feel it was helpful.  She said her  is an .  They have looked into a program called [employer] where they will be set up with a mentor who can talk to her about fertility concerns.  She has had her spouse owns his own business.  He is running for city [place].  She has 2 cysts on the top of her head that she would like checked.  Does not feel they have grown.  They are not bothering her.  She also has a mole on her back she would like checked.  She has not noticed any moles changing size shape color or bleeding.  She also complains about weight gain.  She does not want to hear about diet and exercise.  She said she tries to eat healthy and exercise.  She is not wishing to journal her calories.  She feels her mood is fairly well controlled on the sertraline.  She does complain of a pimple on the left posterior neck that seems to be associated with her menstrual cycle and then goes away and comes back.    The following portions of the patient's history were reviewed and updated as appropriate: past medical history, past social history, past surgical history and problem list.    Review of Systems  Pertinent items are noted in HPI.  Immunization History   Administered Date(s) Administered     DT (pediatric) 09/15/2006     HPV Quadrivalent 09/19/2008, 11/21/2008, 10/12/2009     Hep A, historic 09/19/2008, 03/17/2009     Hep B, historic 07/28/2000, 09/25/2000,  "02/01/2001     Influenza, seasonal,quad inj 36+ mos 11/13/2015, 12/09/2016     Td,adult,historic,unspecified 09/15/2006     Tdap 02/17/2012     No results found for this or any previous visit (from the past 240 hour(s)).  I have had an Advance Directives discussion with the patient.  Objective:    /77 (Patient Site: Left Arm, Patient Position: Sitting, Cuff Size: Adult Regular)  Pulse 82  Temp 98.5  F (36.9  C) (Oral)   Resp 16  Ht 5' 6\" (1.676 m)  Wt 192 lb 6 oz (87.3 kg)  LMP 09/12/2018  BMI 31.05 kg/m2      General Appearance:    Alert, cooperative, no distress, appears stated age   Head:    Normocephalic, without obvious abnormality, atraumatic, she has a pea-sized soft mobile cyst and a 1 cm soft mobile cyst on the anterior crown of her head, they are not painful to palpation   Eyes:    PERRL, conjunctiva/corneas clear, EOM's intact, fundi     benign, both eyes   Ears:    Normal TM's and external ear canals, both ears   Nose:   Nares normal, septum midline, mucosa normal, no drainage    or sinus tenderness   Throat:   Lips, mucosa, and tongue normal; teeth and gums normal   Neck:   Supple, symmetrical, trachea midline, no adenopathy;     thyroid:  no enlargement/tenderness/nodules; no carotid    bruit or JVD   Back:     Symmetric, no curvature, ROM normal, no CVA tenderness   Lungs:     Clear to auscultation bilaterally, respirations unlabored   Chest Wall:    No tenderness or deformity    Heart:    Regular rate and rhythm, S1 and S2 normal, no murmur, rub   or gallop   Breast Exam:    No tenderness, masses, or nipple abnormality   Abdomen:     Soft, non-tender, bowel sounds active all four quadrants,     no masses, no organomegaly   Genitalia:    Normal female without lesion, discharge or tenderness       Extremities:   Extremities normal, atraumatic, no cyanosis or edema   Pulses:   2+ and symmetric all extremities   Skin:   Skin color, texture, turgor normal, no rashes or lesions, fleshy brown " papule on her left mid back proximately 7 mm, red pimple on the left posterior neck, could be a small ingrown hair or sebaceous cyst   Lymph nodes:   Cervical, supraclavicular, and axillary nodes normal   Neurologic:   CNII-XII intact, normal strength, sensation and reflexes     throughout

## 2021-06-20 NOTE — PROGRESS NOTES
"S: The patient is here in follow up of fertility.  She is here with her .  See note from 5/23/17.  She and her  have continued to try to get pregnant without success.  He did have 2 semen analyses, the first with a low total count and low morphology.  The second was done 6 months later on 6/7/17 and showed a normal count.  The morphology wasn't reported.  Her cycles have been about every 26-28 days.  She is getting spotting for 2-5 days before the actual period starts.  The period itself is about 4-5 days long.  Her July cycle started with spotting July 17 then period from July 22-25.  August had spotting from Aug 15-17 then period itself from Aug 18-21.  The first two days are heavy, sometimes with clots, and she has significant pain the first day that improves some the second day and is resolved by day 3.  She had pain like this since she started having periods.  It resolved while she was using NuvaRing.  Since she stopped it about 3 years ago the pain has slowly been worsening.  She has not been testing ovulation recently, but she does note mucus changes around day 12-14.  She did not have the progesterone level done last year.    Outpatient Medications Prior to Visit   Medication Sig Dispense Refill     prenatal vitamin iron-folic acid 27mg-0.8mg (PRENATAL S) 27 mg iron- 800 mcg Tab tablet Take 1 tablet by mouth daily.       sertraline (ZOLOFT) 100 MG tablet Take 1 tablet (100 mg total) by mouth daily. 90 tablet 0     sertraline (ZOLOFT) 50 MG tablet TAKE 1 TABLET BY MOUTH DAILY 90 tablet 2     No facility-administered medications prior to visit.        Patient is allergic to tetracycline.    O:  BP 98/58 (Patient Site: Right Arm, Patient Position: Sitting, Cuff Size: Adult Regular)  Pulse 74  Ht 5' 6\" (1.676 m)  Wt 198 lb (89.8 kg)  LMP 08/18/2018  SpO2 98%  BMI 31.96 kg/m2          Body mass index is 31.96 kg/(m^2).    General: WN/WD WF, NAD    Assessment: 33 y.o. MWF P0 with a desire for " pregnancy and dysmenorrhea.    Plan: We again discussed timing of ovulation and periods.  I again recommended a luteal progesterone.  From when her last period was, she is expecting ovulation on Aug 31.  We discussed that she can try to do ovulation predictor kits over the next couple days or she can watch her mucus to see if her timing is correct.  Then she will make an appointment to get her progesterone level drawn.  She will be notified when the results are back.  We discussed that I will need to know when her next period comes to fully interpret the results.  We also discussed the pain she's having with her periods.  We discussed that it could be consistent with endometriosis, but that the only way to know is by doing a laparoscopy.  We discussed how endometriosis can be related to fertility issues along with pain and heavy periods.  At this time she will get the progesterone done.  In the meantime she will think about laparoscopy and let me know what she'd like to do about that.  She wanted to know if she's up to date on her general health recommendations.  I counseled her that she is due for a Pap smear, but otherwise seems up to date.  I recommended that she make an appointment for a well woman exam with either Dr Arita or one of her partners.    Approximately 25 minutes were spent with the patient with the majority in counseling.

## 2021-06-21 NOTE — LETTER
Letter by Fatoumata Paul MD at      Author: Fatoumata Paul MD Service: -- Author Type: --    Filed:  Encounter Date: 2/1/2021 Status: (Other)                    My Depression Action Plan  Name: Kimberley Mendoza   Date of Birth 1985  Date: 2/1/2021    My Doctor: Fatoumata Paul MD   My Clinic: [address] [facility] [address]  [phone]          GREEN    ZONE   Good Control    What it looks like:     Things are going generally well. You have normal ups and downs. You may even feel depressed from time to time, but bad moods usually last less than a day.   What you need to do:  1. Continue to care for yourself (see self care plan)  2. Check your depression survival kit and update it as needed  3. Follow your physicians recommendations including any medication.  4. Do not stop taking medication unless you consult with your physician first.           YELLOW         ZONE Getting Worse    What it looks like:     Depression is starting to interfere with your life.     It may be hard to get out of bed; you may be starting to isolate yourself from others.    Symptoms of depression are starting to last most all day and this has happened for several days.     You may have suicidal thoughts but they are not constant.   What you need to do:     1. Call your care team. Your response to treatment will improve if you keep your care team informed of your progress. Yellow periods are signs an adjustment may need to be made.     2. Continue your self-care.  Just get dressed and ready for the day.  Don't give yourself time to talk yourself out of it.    3. Talk to someone in your support network.    4. Open up your depression Depression Self-Care Plan / Wellness kit.           RED    ZONE Medical Alert - Get Help    What it looks like:     Depression is seriously interfering with your life.     You may experience these or other symptoms: You cant get out of  bed most days, cant work or engage in other necessary activities, you have trouble taking care of basic hygiene, or basic responsibilities, thoughts of suicide or death that will not go away, self-injurious behavior.     What you need to do:  1. Call your care team and request a same-day appointment. If they are not available (weekends or after hours) call your local crisis line, emergency room or 911.          Depression Self-Care Plan / Wellness Kit    Many people find that medication and therapy are helpful treatments for managing depression. In addition, making small changes to your everyday life can help to boost your mood and improve your wellbeing. Below are some tips for you to consider. Be sure to talk with your medical provider and/or behavioral health consultant if your symptoms are worsening or not improving.     Sleep  Sleep hygiene means all of the habits that support good, restful sleep. It includes maintaining a consistent bedtime and wake time, using your bedroom only for sleeping or sex, and keeping the bedroom dark and free of distractions like a computer, smartphone, or television.     Develop a Healthy Routine  Maintain good hygiene. Get out of bed in the morning, make your bed, brush your teeth, take a shower, and get dressed. Dont spend too much time viewing media that makes you feel stressed. Find time to relax each day.    Exercise  Get some form of exercise every day. This will help reduce pain and release endorphins, the feel good chemicals in your brain. It can be as simple as just going for a walk or doing some gardening, anything that will get you moving.      Diet  Strive to eat healthy foods, including fruits and vegetables. Drink plenty of water. Avoid excessive sugar, caffeine, alcohol, and other mood-altering substances.     Stay Connected with Others  Stay in touch with friends and family members.    Manage Your Mood  Try deep breathing, massage therapy, biofeedback, or  meditation. Take part in fun activities when you can. Try to find something to smile about each day.     Psychotherapy  Be open to working with a therapist if your provider recommends it.     Medication  Be sure to take your medication as prescribed. Most anti-depressants need to be taken every day. It usually takes several weeks for medications to work. Not all medicines work for all people. It is important to follow-up with your provider to make sure you have a treatment plan that is working for you. Do not stop your medication abruptly without first discussing it with your provider.    Crisis Resources   These hotlines are for both adults and children. They and are open 24 hours a day, 7 days a week unless noted otherwise.      National Suicide Prevention Lifeline   0-101-821-TALK (9366)      Crisis Text Line    www.crisistextline.org  Text HOME to 726238 from anywhere in the [place], anytime, about any type of crisis. A live, trained crisis counselor will receive the text and respond quickly.      [place] Lifeline for LGBTQ Youth  A national crisis intervention and suicide lifeline for LGBTQ youth under 25. Provides a safe place to talk without judgement. Call 1-569.597.8845; text START to 959404 or visit www.thetrevorproject.org to talk to a trained counselor.      For [facility] crisis numbers, visit the [facility] website at:  https://[place].gov/dhs/people-we-serve/adults/health-care/mental-health/resources/crisis-contacts.jsp

## 2021-06-23 NOTE — PROGRESS NOTES
ASSESSMENT:  1. Concussion without loss of consciousness, subsequent encounter         PLAN:  No residual symptoms from concussion, continue to monitor.  Iburofen, heat, stretching, if neck pain persists let us know and we will pursue PT referral.  No problem-specific Assessment & Plan notes found for this encounter.      There are no Patient Instructions on file for this visit.    No orders of the defined types were placed in this encounter.    There are no discontinued medications.    No Follow-up on file.    CHIEF COMPLAINT:  Chief Complaint   Patient presents with     Follow-up     pt slipped on the ice on 1/14 and hit her head was seen at the         HISTORY OF PRESENT ILLNESS:  Kimberley is a 33 y.o. female today for follow-up on head injury.  Patient slipped on the [place] on [address] and hit her head.  She is seen at the emergency room right after the jury.  They diagnosed her with mild concussion based on clinical exam and she has felt fine without persistent neurological symptoms afterwards.  She had a slight headache after the initial injury but no continued headache after that.  She had a little bit of brain fog last couple of days but feels like it is improving quite a bit.  No ongoing headache or other blurred vision, tinnitus etc.  Patient also complains today of some neck and upper shoulder tightness and occasional pain.  Depending on positioning, how she is holding her head she will notice more pain or less pain.  She has done a little bit ibuprofen, otherwise no treatments tried.  No tingling down arms or hands.    REVIEW OF SYSTEMS:   Pertinent items are noted in HPI.  All other systems are negative  PFSH:  Reviewed, no changes      TOBACCO USE:  Social History     Tobacco Use   Smoking Status Never Smoker   Smokeless Tobacco Never Used       VITALS:  Vitals:    01/17/19 1144   BP: 94/65   Patient Site: Left Arm   Patient Position: Sitting   Cuff Size: Adult Large   Pulse: 75   Resp: 16   Weight: 196 lb  (88.9 kg)     Wt Readings from Last 3 Encounters:   01/17/19 196 lb (88.9 kg)   10/03/18 192 lb 6 oz (87.3 kg)   08/28/18 198 lb (89.8 kg)       PHYSICAL EXAM:   BP 94/65 (Patient Site: Left Arm, Patient Position: Sitting, Cuff Size: Adult Large)   Pulse 75   Resp 16   Wt 196 lb (88.9 kg)   BMI 31.64 kg/m    General appearance: alert, appears stated age and cooperative  Head: Normocephalic, without obvious abnormality, atraumatic  Eyes: conjunctivae/corneas clear. PERRL, EOM's intact. Fundi benign.  Ears: normal TM's and external ear canals both ears  Nose: Nares normal. Septum midline. Mucosa normal. No drainage or sinus tenderness.  Throat: lips, mucosa, and tongue normal; teeth and gums normal  Neck: no adenopathy, no carotid bruit, no JVD, supple, symmetrical, trachea midline and thyroid not enlarged, symmetric, no tenderness/mass/nodules tenderness upper trapezius to palpation.  Neurologic: Grossly normal    DATA REVIEWED:  Additional History from Old Records Summarized (2): ed records  Decision to Obtain Records (1): 0  Radiology Tests Summarized or Ordered (1): 0  Labs Reviewed or Ordered (1): 0  Medicine Test Summarized or Ordered (1): 0  Independent Review of EKG or X-RAY(2 each): 0    The visit lasted a total of 25 minutes face to face with the patient. Over 50% of the time was spent counseling and educating the patient about plan of care.    MEDICATIONS:  Current Outpatient Medications   Medication Sig Dispense Refill     prenatal vitamin iron-folic acid 27mg-0.8mg (PRENATAL S) 27 mg iron- 800 mcg Tab tablet Take 1 tablet by mouth daily.       sertraline (ZOLOFT) 100 MG tablet Take 1 tablet (100 mg total) by mouth daily. 90 tablet 3     No current facility-administered medications for this visit.        This note has been dictated using voice recognition software. Any grammatical or context distortions are unintentional and inherent to the software

## 2021-06-26 ENCOUNTER — HEALTH MAINTENANCE LETTER (OUTPATIENT)
Age: 36
End: 2021-06-26

## 2021-10-16 ENCOUNTER — HEALTH MAINTENANCE LETTER (OUTPATIENT)
Age: 36
End: 2021-10-16

## 2021-10-22 ENCOUNTER — IMMUNIZATION (OUTPATIENT)
Dept: FAMILY MEDICINE | Facility: CLINIC | Age: 36
End: 2021-10-22
Payer: COMMERCIAL

## 2021-10-22 PROCEDURE — 90471 IMMUNIZATION ADMIN: CPT

## 2021-10-22 PROCEDURE — 90686 IIV4 VACC NO PRSV 0.5 ML IM: CPT

## 2022-01-08 DIAGNOSIS — F34.1 DYSTHYMIC DISORDER: ICD-10-CM

## 2022-01-10 RX ORDER — SERTRALINE HYDROCHLORIDE 100 MG/1
TABLET, FILM COATED ORAL
Qty: 90 TABLET | Refills: 1 | Status: SHIPPED | OUTPATIENT
Start: 2022-01-10 | End: 2022-02-03

## 2022-01-26 ENCOUNTER — TRANSFERRED RECORDS (OUTPATIENT)
Dept: HEALTH INFORMATION MANAGEMENT | Facility: CLINIC | Age: 37
End: 2022-01-26
Payer: COMMERCIAL

## 2022-02-03 ENCOUNTER — OFFICE VISIT (OUTPATIENT)
Dept: FAMILY MEDICINE | Facility: CLINIC | Age: 37
End: 2022-02-03
Payer: COMMERCIAL

## 2022-02-03 VITALS
WEIGHT: 168.38 LBS | BODY MASS INDEX: 27.18 KG/M2 | HEART RATE: 76 BPM | DIASTOLIC BLOOD PRESSURE: 67 MMHG | SYSTOLIC BLOOD PRESSURE: 93 MMHG | TEMPERATURE: 97.9 F | RESPIRATION RATE: 16 BRPM

## 2022-02-03 DIAGNOSIS — Z11.4 SCREENING FOR HIV (HUMAN IMMUNODEFICIENCY VIRUS): ICD-10-CM

## 2022-02-03 DIAGNOSIS — F34.1 DYSTHYMIC DISORDER: ICD-10-CM

## 2022-02-03 DIAGNOSIS — Z11.59 NEED FOR HEPATITIS C SCREENING TEST: ICD-10-CM

## 2022-02-03 DIAGNOSIS — N97.9 INFERTILITY, FEMALE: ICD-10-CM

## 2022-02-03 DIAGNOSIS — Z00.00 ENCOUNTER FOR PREVENTATIVE ADULT HEALTH CARE EXAMINATION: ICD-10-CM

## 2022-02-03 DIAGNOSIS — D22.62 MELANOCYTIC NEVUS OF LEFT UPPER EXTREMITY: Primary | ICD-10-CM

## 2022-02-03 LAB
ALBUMIN SERPL-MCNC: 4.2 G/DL (ref 3.5–5)
ALP SERPL-CCNC: 41 U/L (ref 45–120)
ALT SERPL W P-5'-P-CCNC: 15 U/L (ref 0–45)
ANION GAP SERPL CALCULATED.3IONS-SCNC: 8 MMOL/L (ref 5–18)
AST SERPL W P-5'-P-CCNC: 15 U/L (ref 0–40)
BILIRUB SERPL-MCNC: 0.9 MG/DL (ref 0–1)
BUN SERPL-MCNC: 9 MG/DL (ref 8–22)
CALCIUM SERPL-MCNC: 9.3 MG/DL (ref 8.5–10.5)
CHLORIDE BLD-SCNC: 105 MMOL/L (ref 98–107)
CHOLEST SERPL-MCNC: 145 MG/DL
CO2 SERPL-SCNC: 27 MMOL/L (ref 22–31)
CREAT SERPL-MCNC: 0.81 MG/DL (ref 0.6–1.1)
ERYTHROCYTE [DISTWIDTH] IN BLOOD BY AUTOMATED COUNT: 12.9 % (ref 10–15)
FASTING STATUS PATIENT QL REPORTED: YES
GFR SERPL CREATININE-BSD FRML MDRD: >90 ML/MIN/1.73M2
GLUCOSE BLD-MCNC: 85 MG/DL (ref 70–125)
HCT VFR BLD AUTO: 42.7 % (ref 35–47)
HDLC SERPL-MCNC: 58 MG/DL
HGB BLD-MCNC: 14.2 G/DL (ref 11.7–15.7)
LDLC SERPL CALC-MCNC: 74 MG/DL
MCH RBC QN AUTO: 30.5 PG (ref 26.5–33)
MCHC RBC AUTO-ENTMCNC: 33.3 G/DL (ref 31.5–36.5)
MCV RBC AUTO: 92 FL (ref 78–100)
PLATELET # BLD AUTO: 252 10E3/UL (ref 150–450)
POTASSIUM BLD-SCNC: 4.2 MMOL/L (ref 3.5–5)
PROT SERPL-MCNC: 6.7 G/DL (ref 6–8)
RBC # BLD AUTO: 4.66 10E6/UL (ref 3.8–5.2)
SODIUM SERPL-SCNC: 140 MMOL/L (ref 136–145)
TRIGL SERPL-MCNC: 65 MG/DL
TSH SERPL DL<=0.005 MIU/L-ACNC: 1.6 UIU/ML (ref 0.3–5)
WBC # BLD AUTO: 5.2 10E3/UL (ref 4–11)

## 2022-02-03 PROCEDURE — 99395 PREV VISIT EST AGE 18-39: CPT | Mod: 25 | Performed by: FAMILY MEDICINE

## 2022-02-03 PROCEDURE — 85027 COMPLETE CBC AUTOMATED: CPT | Performed by: FAMILY MEDICINE

## 2022-02-03 PROCEDURE — 82306 VITAMIN D 25 HYDROXY: CPT | Performed by: FAMILY MEDICINE

## 2022-02-03 PROCEDURE — 36415 COLL VENOUS BLD VENIPUNCTURE: CPT | Performed by: FAMILY MEDICINE

## 2022-02-03 PROCEDURE — 80053 COMPREHEN METABOLIC PANEL: CPT | Performed by: FAMILY MEDICINE

## 2022-02-03 PROCEDURE — 90715 TDAP VACCINE 7 YRS/> IM: CPT | Performed by: FAMILY MEDICINE

## 2022-02-03 PROCEDURE — 84443 ASSAY THYROID STIM HORMONE: CPT | Performed by: FAMILY MEDICINE

## 2022-02-03 PROCEDURE — 90471 IMMUNIZATION ADMIN: CPT | Performed by: FAMILY MEDICINE

## 2022-02-03 PROCEDURE — 80061 LIPID PANEL: CPT | Performed by: FAMILY MEDICINE

## 2022-02-03 RX ORDER — SERTRALINE HYDROCHLORIDE 100 MG/1
100 TABLET, FILM COATED ORAL DAILY
Qty: 90 TABLET | Refills: 3 | Status: SHIPPED | OUTPATIENT
Start: 2022-02-03 | End: 2023-02-20

## 2022-02-03 RX ORDER — CHOLECALCIFEROL (VITAMIN D3) 50 MCG
TABLET ORAL
COMMUNITY

## 2022-02-03 RX ORDER — BACLOFEN 20 MG
TABLET ORAL
COMMUNITY

## 2022-02-03 RX ORDER — ADAPALENE GEL USP, 0.3% 3 MG/G
GEL TOPICAL
COMMUNITY
Start: 2021-04-28

## 2022-02-03 ASSESSMENT — PATIENT HEALTH QUESTIONNAIRE - PHQ9: SUM OF ALL RESPONSES TO PHQ QUESTIONS 1-9: 5

## 2022-02-03 NOTE — PROGRESS NOTES
SUBJECTIVE:   CC: Kimberley Mendoza is an 36 year old woman who presents for preventive health visit.       Patient has been advised of split billing requirements and indicates understanding: Yes  Healthy Habits:     Getting at least 3 servings of Calcium per day:  Yes    Bi-annual eye exam:  Yes    Dental care twice a year:  Yes    Sleep apnea or symptoms of sleep apnea:  None    Diet:  Regular (no restrictions)    Frequency of exercise:  2-3 days/week    Duration of exercise:  30-45 minutes    Taking medications regularly:  Yes    Medication side effects:  Not applicable    PHQ-2 Total Score: 2    Additional concerns today:  No    Mental health / Depression and anxiety:  Sees therapist monthly.  Doing ok.  Mood worse with menses. On Sertraline 100 mg daily.    Social:  Traveling to [place] in 2 weeks.  Hoping to go to [place] in Oct.    Infertility: She and her  are not wishing to pursue any work-up for this.  They would be happy with the pregnancy but are fine with not getting pregnant as well.      Health Maintenance   Topic Date Due     ANNUAL REVIEW OF HM ORDERS  Today     HIV SCREENING  NA     HEPATITIS C SCREENING  NA     PHQ-9  Today     PREVENTIVE CARE VISIT  Today     DTAP/TDAP/TD IMMUNIZATION (3 - Td or Tdap) 02/17/2022, today     HPV TEST  Due in 2 years     ADVANCE CARE PLANNING  On file     PAP  Due in 2 years     DEPRESSION ACTION PLAN  Completed     INFLUENZA VACCINE  Completed     HEPATITIS B IMMUNIZATION  Completed     COVID-19 Vaccine  Completed     Pneumococcal Vaccine: Pediatrics (0 to 5 Years) and At-Risk Patients (6 to 64 Years)  Due age 65     IPV IMMUNIZATION  Aged Out     MENINGITIS IMMUNIZATION  Aged Out                 Today's PHQ-2 Score:   PHQ-2 ( 1999 Pfizer) 1/31/2022   Q1: Little interest or pleasure in doing things 1   Q2: Feeling down, depressed or hopeless 1   PHQ-2 Score 2   Q1: Little interest or pleasure in doing things Several days   Q2: Feeling down, depressed or  hopeless Several days   PHQ-2 Score 2       Abuse: Current or Past (Physical, Sexual or Emotional) - No  Do you feel safe in your environment? Yes        Social History     Tobacco Use     Smoking status: Never Smoker     Smokeless tobacco: Never Used   Substance Use Topics     Alcohol use: Not on file         Alcohol Use 1/31/2022   Prescreen: >3 drinks/day or >7 drinks/week? No       Reviewed orders with patient.  Reviewed health maintenance and updated orders accordingly - Yes  Lab work is in process    Breast Cancer Screening:    Breast CA Risk Assessment (FHS-7) 1/31/2022   Do you have a family history of breast, colon, or ovarian cancer? No / Unknown           Pertinent mammograms are reviewed under the imaging tab.    History of abnormal Pap smear: NO - age 30- 65 PAP every 3 years recommended  PAP / HPV Latest Ref Rng & Units 2/1/2021 10/3/2018   PAP Negative for squamous intraepithelial lesion or malignancy. Negative for squamous intraepithelial lesion or malignancy  Electronically signed by Josiane Woodson CT (ASCP) on 2/10/2021 at  2:08 PM   Negative for squamous intraepithelial lesion or malignancy  Electronically signed by Shona Doyle CT (ASCP) on 10/11/2018 at  2:57 PM     HPV16 NEG Negative Negative   HPV18 NEG Negative Negative   HRHPV NEG Negative Negative     Reviewed and updated as needed this visit by clinical staff  Tobacco  Allergies  Meds             Reviewed and updated as needed this visit by Provider                   Review of Systems  CONSTITUTIONAL: NEGATIVE for fever, chills, change in weight  INTEGUMENTARU/SKIN: NEGATIVE for worrisome rashes, moles or lesions  EYES: NEGATIVE for vision changes or irritation  ENT: NEGATIVE for ear, mouth and throat problems  RESP: NEGATIVE for significant cough or SOB  BREAST: NEGATIVE for masses, tenderness or discharge  CV: NEGATIVE for chest pain, palpitations or peripheral edema  GI: NEGATIVE for nausea, abdominal pain, heartburn, or  change in bowel habits  : NEGATIVE for unusual urinary or vaginal symptoms. Periods are regular.  MUSCULOSKELETAL: NEGATIVE for significant arthralgias or myalgia  NEURO: NEGATIVE for weakness, dizziness or paresthesias  PSYCHIATRIC: NEGATIVE for changes in mood or affect     OBJECTIVE:   BP 93/67 (BP Location: Left arm, Patient Position: Sitting, Cuff Size: Adult Regular)   Pulse 76   Temp 97.9  F (36.6  C) (Oral)   Resp 16   Wt 76.4 kg (168 lb 6 oz)   LMP 01/16/2022   BMI 27.18 kg/m    Physical Exam  GENERAL: healthy, alert and no distress  EYES: Eyes grossly normal to inspection, PERRL and conjunctivae and sclerae normal  HENT: ear canals and TM's normal, nose and mouth without ulcers or lesions  NECK: no adenopathy, no asymmetry, masses, or scars and thyroid normal to palpation  RESP: lungs clear to auscultation - no rales, rhonchi or wheezes  BREAST: normal without masses, tenderness or nipple discharge and no palpable axillary masses or adenopathy  CV: regular rate and rhythm, normal S1 S2, no S3 or S4, no murmur, click or rub, no peripheral edema and peripheral pulses strong  ABDOMEN: soft, nontender, no hepatosplenomegaly, no masses and bowel sounds normal   (female): normal female external genitalia, normal urethral meatus, vaginal mucosa pink, moist, well rugated, and normal cervix/adnexa/uterus without masses or discharge  MS: no gross musculoskeletal defects noted, no edema  SKIN: no suspicious lesions or rashes  NEURO: Normal strength and tone, mentation intact and speech normal  PSYCH: mentation appears normal, affect normal/bright    Diagnostic Test Results:  Labs reviewed in Epic    ASSESSMENT/PLAN:       ICD-10-CM    1. Melanocytic nevus of left upper extremity  D22.62    2. Dysthymic disorder  F34.1 sertraline (ZOLOFT) 100 MG tablet     TSH with free T4 reflex     CBC with platelets     Vitamin D Deficiency     Comprehensive metabolic panel     TSH with free T4 reflex     CBC with  "platelets     Vitamin D Deficiency     Comprehensive metabolic panel   3. Screening for HIV (human immunodeficiency virus)  Z11.4    4. Need for hepatitis C screening test  Z11.59    5. Infertility, female  N97.9    6. Encounter for preventative adult health care examination  Z00.00 Lipid Profile     Lipid Profile     Mammogram due at age 40 and yearly. If your insurance will cover a baseline between 35 and 40 let me know I will place an order.    Colonoscopy for screening is at age 45. If there is any family history of colon cancer or colon polyps we start at 40. We will do a colonoscopy anytime if you have blood in your stool or change in bowel habits.    Sees Dermatology routinely.    Seeing a therapist routinely and on sertraline 100 mg daily.    If it anytime you wish a referral to a fertility clinic please let me know.    It would not be a bad idea to be on a multivitamin with folic acid in case you were to become pregnant to prevent spina bifida.    Patient has been advised of split billing requirements and indicates understanding: Yes    COUNSELING:  Reviewed preventive health counseling, as reflected in patient instructions       Regular exercise       Healthy diet/nutrition    Estimated body mass index is 27.18 kg/m  as calculated from the following:    Height as of 2/1/21: 1.676 m (5' 6\").    Weight as of this encounter: 76.4 kg (168 lb 6 oz).    Weight management plan: Discussed healthy diet and exercise guidelines    She reports that she has never smoked. She has never used smokeless tobacco.      Counseling Resources:  ATP IV Guidelines  Pooled Cohorts Equation Calculator  Breast Cancer Risk Calculator  BRCA-Related Cancer Risk Assessment: FHS-7 Tool  FRAX Risk Assessment  ICSI Preventive Guidelines  Dietary Guidelines for Americans, 2010  USDA's MyPlate  ASA Prophylaxis  Lung CA Screening    Fatoumata Paul MD  [facility]  "

## 2022-02-03 NOTE — PATIENT INSTRUCTIONS
Mammogram due at age 40 and yearly. If your insurance will cover a baseline between 35 and 40 let me know I will place an order.    Colonoscopy for screening is at age 45. If there is any family history of colon cancer or colon polyps we start at 40. We will do a colonoscopy anytime if you have blood in your stool or change in bowel habits.    Sees Dermatology routinely.    Seeing a therapist routinely and on sertraline 100 mg daily.    If it anytime you wish a referral to a fertility clinic please let me know.    It would not be a bad idea to be on a multivitamin with folic acid in case you were to become pregnant to prevent spina bifida.    Health Maintenance   Topic Date Due     ANNUAL REVIEW OF HM ORDERS  Today     HIV SCREENING  NA     HEPATITIS C SCREENING  NA     PHQ-9  Today     PREVENTIVE CARE VISIT  Today     DTAP/TDAP/TD IMMUNIZATION (3 - Td or Tdap) 02/17/2022, today     HPV TEST  Due in 2 years     ADVANCE CARE PLANNING  On file     PAP  Due in 2 years     DEPRESSION ACTION PLAN  Completed     INFLUENZA VACCINE  Completed     HEPATITIS B IMMUNIZATION  Completed     COVID-19 Vaccine  Completed     Pneumococcal Vaccine: Pediatrics (0 to 5 Years) and At-Risk Patients (6 to 64 Years)  Due age 65     IPV IMMUNIZATION  Aged Out     MENINGITIS IMMUNIZATION  Aged Out

## 2022-02-04 LAB — DEPRECATED CALCIDIOL+CALCIFEROL SERPL-MC: 43 UG/L (ref 30–80)

## 2022-02-09 ENCOUNTER — TRANSFERRED RECORDS (OUTPATIENT)
Dept: HEALTH INFORMATION MANAGEMENT | Facility: CLINIC | Age: 37
End: 2022-02-09
Payer: COMMERCIAL

## 2022-03-02 ENCOUNTER — TRANSFERRED RECORDS (OUTPATIENT)
Dept: HEALTH INFORMATION MANAGEMENT | Facility: CLINIC | Age: 37
End: 2022-03-02
Payer: COMMERCIAL

## 2022-09-09 ENCOUNTER — TRANSFERRED RECORDS (OUTPATIENT)
Dept: HEALTH INFORMATION MANAGEMENT | Facility: CLINIC | Age: 37
End: 2022-09-09

## 2022-09-25 ENCOUNTER — HEALTH MAINTENANCE LETTER (OUTPATIENT)
Age: 37
End: 2022-09-25

## 2022-11-09 ENCOUNTER — TRANSFERRED RECORDS (OUTPATIENT)
Dept: HEALTH INFORMATION MANAGEMENT | Facility: CLINIC | Age: 37
End: 2022-11-09

## 2022-12-05 ENCOUNTER — TRANSFERRED RECORDS (OUTPATIENT)
Dept: HEALTH INFORMATION MANAGEMENT | Facility: CLINIC | Age: 37
End: 2022-12-05

## 2023-01-05 ENCOUNTER — TRANSFERRED RECORDS (OUTPATIENT)
Dept: HEALTH INFORMATION MANAGEMENT | Facility: CLINIC | Age: 38
End: 2023-01-05

## 2023-02-21 ASSESSMENT — ENCOUNTER SYMPTOMS
WEAKNESS: 0
HEMATURIA: 0
COUGH: 0
HEARTBURN: 0
DYSURIA: 0
SORE THROAT: 0
MYALGIAS: 0
CHILLS: 0
PARESTHESIAS: 0
BREAST MASS: 0
DIZZINESS: 0
FREQUENCY: 0
NERVOUS/ANXIOUS: 1
CONSTIPATION: 0
FEVER: 0
EYE PAIN: 0
JOINT SWELLING: 0
NAUSEA: 0
PALPITATIONS: 0
ABDOMINAL PAIN: 0
ARTHRALGIAS: 0
HEMATOCHEZIA: 0
SHORTNESS OF BREATH: 0
HEADACHES: 0
DIARRHEA: 0

## 2023-02-21 ASSESSMENT — PATIENT HEALTH QUESTIONNAIRE - PHQ9
SUM OF ALL RESPONSES TO PHQ QUESTIONS 1-9: 15
SUM OF ALL RESPONSES TO PHQ QUESTIONS 1-9: 15
10. IF YOU CHECKED OFF ANY PROBLEMS, HOW DIFFICULT HAVE THESE PROBLEMS MADE IT FOR YOU TO DO YOUR WORK, TAKE CARE OF THINGS AT HOME, OR GET ALONG WITH OTHER PEOPLE: SOMEWHAT DIFFICULT

## 2023-02-23 ENCOUNTER — OFFICE VISIT (OUTPATIENT)
Dept: FAMILY MEDICINE | Facility: CLINIC | Age: 38
End: 2023-02-23
Payer: COMMERCIAL

## 2023-02-23 VITALS
RESPIRATION RATE: 16 BRPM | SYSTOLIC BLOOD PRESSURE: 115 MMHG | TEMPERATURE: 98.8 F | HEIGHT: 67 IN | DIASTOLIC BLOOD PRESSURE: 68 MMHG | OXYGEN SATURATION: 100 % | WEIGHT: 181.25 LBS | BODY MASS INDEX: 28.45 KG/M2 | HEART RATE: 83 BPM

## 2023-02-23 DIAGNOSIS — Z11.4 SCREENING FOR HIV (HUMAN IMMUNODEFICIENCY VIRUS): ICD-10-CM

## 2023-02-23 DIAGNOSIS — Z11.59 NEED FOR HEPATITIS C SCREENING TEST: ICD-10-CM

## 2023-02-23 DIAGNOSIS — N94.6 DYSMENORRHEA: ICD-10-CM

## 2023-02-23 DIAGNOSIS — Z00.00 ENCOUNTER FOR PREVENTATIVE ADULT HEALTH CARE EXAMINATION: Primary | ICD-10-CM

## 2023-02-23 DIAGNOSIS — F34.1 DYSTHYMIC DISORDER: ICD-10-CM

## 2023-02-23 DIAGNOSIS — D22.9 COMPOUND NEVUS: ICD-10-CM

## 2023-02-23 LAB
ALBUMIN SERPL BCG-MCNC: 4.4 G/DL (ref 3.5–5.2)
ALP SERPL-CCNC: 49 U/L (ref 35–104)
ALT SERPL W P-5'-P-CCNC: 31 U/L (ref 10–35)
ANION GAP SERPL CALCULATED.3IONS-SCNC: 12 MMOL/L (ref 7–15)
AST SERPL W P-5'-P-CCNC: 19 U/L (ref 10–35)
BILIRUB SERPL-MCNC: 0.6 MG/DL
BUN SERPL-MCNC: 10.2 MG/DL (ref 6–20)
CALCIUM SERPL-MCNC: 9.5 MG/DL (ref 8.6–10)
CHLORIDE SERPL-SCNC: 105 MMOL/L (ref 98–107)
CHOLEST SERPL-MCNC: 167 MG/DL
CREAT SERPL-MCNC: 0.83 MG/DL (ref 0.51–0.95)
DEPRECATED HCO3 PLAS-SCNC: 25 MMOL/L (ref 22–29)
ERYTHROCYTE [DISTWIDTH] IN BLOOD BY AUTOMATED COUNT: 12.9 % (ref 10–15)
GFR SERPL CREATININE-BSD FRML MDRD: >90 ML/MIN/1.73M2
GLUCOSE SERPL-MCNC: 62 MG/DL (ref 70–99)
HCT VFR BLD AUTO: 37.4 % (ref 35–47)
HDLC SERPL-MCNC: 63 MG/DL
HGB BLD-MCNC: 12.9 G/DL (ref 11.7–15.7)
LDLC SERPL CALC-MCNC: 91 MG/DL
MCH RBC QN AUTO: 31.5 PG (ref 26.5–33)
MCHC RBC AUTO-ENTMCNC: 34.5 G/DL (ref 31.5–36.5)
MCV RBC AUTO: 91 FL (ref 78–100)
NONHDLC SERPL-MCNC: 104 MG/DL
PLATELET # BLD AUTO: 270 10E3/UL (ref 150–450)
POTASSIUM SERPL-SCNC: 4.2 MMOL/L (ref 3.4–5.3)
PROT SERPL-MCNC: 6.6 G/DL (ref 6.4–8.3)
RBC # BLD AUTO: 4.09 10E6/UL (ref 3.8–5.2)
SODIUM SERPL-SCNC: 142 MMOL/L (ref 136–145)
TRIGL SERPL-MCNC: 67 MG/DL
TSH SERPL DL<=0.005 MIU/L-ACNC: 1.3 UIU/ML (ref 0.3–4.2)
WBC # BLD AUTO: 5.6 10E3/UL (ref 4–11)

## 2023-02-23 PROCEDURE — 86803 HEPATITIS C AB TEST: CPT | Performed by: FAMILY MEDICINE

## 2023-02-23 PROCEDURE — 80061 LIPID PANEL: CPT | Performed by: FAMILY MEDICINE

## 2023-02-23 PROCEDURE — 85027 COMPLETE CBC AUTOMATED: CPT | Performed by: FAMILY MEDICINE

## 2023-02-23 PROCEDURE — 99395 PREV VISIT EST AGE 18-39: CPT | Performed by: FAMILY MEDICINE

## 2023-02-23 PROCEDURE — 36415 COLL VENOUS BLD VENIPUNCTURE: CPT | Performed by: FAMILY MEDICINE

## 2023-02-23 PROCEDURE — 84443 ASSAY THYROID STIM HORMONE: CPT | Performed by: FAMILY MEDICINE

## 2023-02-23 PROCEDURE — 80053 COMPREHEN METABOLIC PANEL: CPT | Performed by: FAMILY MEDICINE

## 2023-02-23 PROCEDURE — 82306 VITAMIN D 25 HYDROXY: CPT | Performed by: FAMILY MEDICINE

## 2023-02-23 PROCEDURE — 99213 OFFICE O/P EST LOW 20 MIN: CPT | Mod: 25 | Performed by: FAMILY MEDICINE

## 2023-02-23 PROCEDURE — 87389 HIV-1 AG W/HIV-1&-2 AB AG IA: CPT | Performed by: FAMILY MEDICINE

## 2023-02-23 RX ORDER — SERTRALINE HYDROCHLORIDE 100 MG/1
100 TABLET, FILM COATED ORAL DAILY
Qty: 90 TABLET | Refills: 1
Start: 2023-02-23 | End: 2023-08-13

## 2023-02-23 ASSESSMENT — ENCOUNTER SYMPTOMS
DIZZINESS: 0
ABDOMINAL PAIN: 0
SHORTNESS OF BREATH: 0
NERVOUS/ANXIOUS: 1
PALPITATIONS: 0
EYE PAIN: 0
HEADACHES: 0
HEMATOCHEZIA: 0
DIARRHEA: 0
MYALGIAS: 0
SORE THROAT: 0
CONSTIPATION: 0
DYSURIA: 0
BREAST MASS: 0
FREQUENCY: 0
CHILLS: 0
PARESTHESIAS: 0
WEAKNESS: 0
FEVER: 0
COUGH: 0
ARTHRALGIAS: 0
HEMATURIA: 0
NAUSEA: 0
JOINT SWELLING: 0
HEARTBURN: 0

## 2023-02-23 NOTE — PROGRESS NOTES
SUBJECTIVE:   CC: Randee is an 37 year old who presents for preventive health visit.   Patient has been advised of split billing requirements and indicates understanding: Yes  Healthy Habits:     Getting at least 3 servings of Calcium per day:  Yes    Bi-annual eye exam:  Yes    Dental care twice a year:  Yes    Sleep apnea or symptoms of sleep apnea:  None    Diet:  Regular (no restrictions)    Frequency of exercise:  2-3 days/week    Duration of exercise:  15-30 minutes    Taking medications regularly:  Yes    Medication side effects:  Not applicable    PHQ-2 Total Score: 4    Additional concerns today:  Yes    Depression:  Regularly sees her therapist.   started his own business.  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  This is the same time of year that her grandfather passed away.  Feel like her tolerance to mange things is lower.  Has been on Sertraline for several years.  Had been helpful in the past but having more symptoms of anxiety and depression recently.  Does contract against suicide and homicide.    Dysmenorrhea:  Can have severe cramps with her menses. Not wanting birch control now and they may try to conceive in the future. Periods monthly    Social:  Depression on both sides of the family.    Health Maintenance   Topic Date Due     HIV SCREENING  Today     HEPATITIS C SCREENING  Today     ANNUAL REVIEW OF HM ORDERS  Today     YEARLY PREVENTIVE VISIT  Today     PHQ-9  Today     HPV TEST  Next year     ADVANCE CARE PLANNING  Packet given     PAP  Next year     DTAP/TDAP/TD IMMUNIZATION (4 - Td or Tdap) 02/03/2032     DEPRESSION ACTION PLAN  Completed     INFLUENZA VACCINE  Completed     HEPATITIS B IMMUNIZATION  Completed     COVID-19 Vaccine  Completed     Pneumococcal Vaccine: Pediatrics (0 to 5 Years) and At-Risk Patients (6 to 64 Years)  Age 65     IPV IMMUNIZATION  Aged Out     MENINGITIS IMMUNIZATION  Aged Out               Today's PHQ-2 Score:   PHQ-2 ( 1999  [employer]) 2/21/2023   Q1: Little interest or pleasure in doing things 2   Q2: Feeling down, depressed or hopeless 2   PHQ-2 Score 4   Q1: Little interest or pleasure in doing things More than half the days   Q2: Feeling down, depressed or hopeless More than half the days   PHQ-2 Score 4           Social History     Tobacco Use     Smoking status: Never     Smokeless tobacco: Never   Substance Use Topics     Alcohol use: Not on file         Alcohol Use 2/21/2023   Prescreen: >3 drinks/day or >7 drinks/week? No       Reviewed orders with patient.  Reviewed health maintenance and updated orders accordingly - Yes  Lab work is in process    Breast Cancer Screening:    Breast CA Risk Assessment (FHS-7) 1/31/2022   Do you have a family history of breast, colon, or ovarian cancer? No / Unknown           Pertinent mammograms are reviewed under the imaging tab.    History of abnormal Pap smear: NO - age 30- 65 PAP every 3 years recommended  PAP / HPV Latest Ref Rng & Units 2/1/2021 10/3/2018   PAP Negative for squamous intraepithelial lesion or malignancy. Negative for squamous intraepithelial lesion or malignancy  Electronically signed by Josiane Woodson CT (ASCP) on 2/10/2021 at  2:08 PM   Negative for squamous intraepithelial lesion or malignancy  Electronically signed by Shona Doyle CT (ASCP) on 10/11/2018 at  2:57 PM     HPV16 NEG Negative Negative   HPV18 NEG Negative Negative   HRHPV NEG Negative Negative     Reviewed and updated as needed this visit by clinical staff   Tobacco  Allergies  Meds              Reviewed and updated as needed this visit by Provider                     Review of Systems   Constitutional: Negative for chills and fever.   HENT: Negative for congestion, ear pain, hearing loss and sore throat.    Eyes: Negative for pain and visual disturbance.   Respiratory: Negative for cough and shortness of breath.    Cardiovascular: Negative for chest pain, palpitations and peripheral edema.  "  Gastrointestinal: Negative for abdominal pain, constipation, diarrhea, heartburn, hematochezia and nausea.   Breasts:  Negative for tenderness, breast mass and discharge.   Genitourinary: Negative for dysuria, frequency, genital sores, hematuria, pelvic pain, urgency, vaginal bleeding and vaginal discharge.   Musculoskeletal: Negative for arthralgias, joint swelling and myalgias.   Skin: Negative for rash.   Neurological: Negative for dizziness, weakness, headaches and paresthesias.   Psychiatric/Behavioral: Positive for mood changes. The patient is nervous/anxious.           OBJECTIVE:   /68 (BP Location: Left arm, Patient Position: Sitting, Cuff Size: Adult Regular)   Pulse 83   Temp 98.8  F (37.1  C) (Oral)   Resp 16   Ht 1.689 m (5' 6.5\")   Wt 82.2 kg (181 lb 4 oz)   LMP 02/10/2023   SpO2 100%   BMI 28.82 kg/m    Physical Exam  GENERAL: healthy, alert and no distress  EYES: Eyes grossly normal to inspection, PERRL and conjunctivae and sclerae normal  HENT: ear canals and TM's normal, nose and mouth without ulcers or lesions  NECK: no adenopathy, no asymmetry, masses, or scars and thyroid normal to palpation  RESP: lungs clear to auscultation - no rales, rhonchi or wheezes  BREAST: normal without masses, tenderness or nipple discharge and no palpable axillary masses or adenopathy  CV: regular rate and rhythm, normal S1 S2, no S3 or S4, no murmur, click or rub, no peripheral edema and peripheral pulses strong  ABDOMEN: soft, nontender, no hepatosplenomegaly, no masses and bowel sounds normal   (female): normal female external genitalia, normal urethral meatus, vaginal mucosa pink, moist, well rugated, and normal cervix/adnexa/uterus without masses or discharge  MS: no gross musculoskeletal defects noted, no edema  SKIN: no suspicious lesions or rashes  NEURO: Normal strength and tone, mentation intact and speech normal  PSYCH: mentation appears normal, affect normal/bright    Diagnostic Test " Results:  Labs reviewed in Epic    ASSESSMENT/PLAN:       ICD-10-CM    1. Encounter for preventative adult health care examination  Z00.00 Lipid Profile     Lipid Profile      2. Screening for HIV (human immunodeficiency virus)  Z11.4 HIV Antigen Antibody Combo     HIV Antigen Antibody Combo      3. Need for hepatitis C screening test  Z11.59 Hepatitis C Screen Reflex to HCV RNA Quant and Genotype     Hepatitis C Screen Reflex to HCV RNA Quant and Genotype      4. Depression With Anxiety  F34.1 sertraline (ZOLOFT) 50 MG tablet      5. 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 Dysmenorrhea  N94.6 CBC with platelets     TSH with free T4 reflex     Vitamin D Deficiency     CBC with platelets     TSH with free T4 reflex     Vitamin D Deficiency        Mammograms recommended yearly at age 40.  If insurance will cover a baseline between 35 and 40 that is not a bad idea.  If they do then you can call [phone] to set that up at our breast center.    Screening colonoscopy recommended at age 45.  We would do it at any time if you had blood in your stool or change in bowel habits.  We would also do it at age 40 if you had a family history of colon cancer or the adenomatous colon polyps.    I am glad you are seeing a therapist.    Increase Sertraline to 150 mg by adding a 50 mg tab to the 100 mg tab.     I am glad  you see Dermatology.    For your dysmenorrhea when you start the spotting you can start taking ibuprofen 800 mg 3 times a day with food and continue that through the heavy days of your period and that may help with the cramping.    Normal cycles, anywhere from every 26 to 40 days.    Patient has been advised of split billing requirements and indicates understanding: Yes      COUNSELING:  Reviewed preventive health counseling, as  "reflected in patient instructions       Regular exercise       Healthy diet/nutrition      BMI:   Estimated body mass index is 28.82 kg/m  as calculated from the following:    Height as of this encounter: 1.689 m (5' 6.5\").    Weight as of this encounter: 82.2 kg (181 lb 4 oz).   Weight management plan: Discussed healthy diet and exercise guidelines      She reports that she has never smoked. She has never used smokeless tobacco.      Fatoumata Paul MD  [facility]  Answers for HPI/ROS submitted by the patient on 2/21/2023  If you checked off any problems, how difficult have these problems made it for you to do your work, take care of things at home, or get along with other people?: Somewhat difficult  PHQ9 TOTAL SCORE: 15      "

## 2023-02-23 NOTE — PATIENT INSTRUCTIONS
Mammograms recommended yearly at age 40.  If insurance will cover a baseline between 35 and 40 that is not a bad idea.  If they do then you can call [phone] to set that up at our breast center.    Screening colonoscopy recommended at age 45.  We would do it at any time if you had blood in your stool or change in bowel habits.  We would also do it at age 40 if you had a family history of colon cancer or the adenomatous colon polyps.    I am glad you are seeing a therapist.    Increase Sertraline to 150mg by adding a 50 mg tab to the 100 mg tab.     I am glad  you see Dermatology.    For your dysmenorrhea when you start the spotting you can start taking ibuprofen 800 mg 3 times a day with food and continue that through the heavy days of your period and that may help with the cramping.    Normal cycles, anywhere from every 26 to 40 days.      Health Maintenance   Topic Date Due    HIV SCREENING  Today    HEPATITIS C SCREENING  Today    ANNUAL REVIEW OF HM ORDERS  Today    YEARLY PREVENTIVE VISIT  Today    PHQ-9  Today    HPV TEST  Next year    ADVANCE CARE PLANNING  Packet given    PAP  Next year    DTAP/TDAP/TD IMMUNIZATION (4 - Td or Tdap) 02/03/2032    DEPRESSION ACTION PLAN  Completed    INFLUENZA VACCINE  Completed    HEPATITIS B IMMUNIZATION  Completed    COVID-19 Vaccine  Completed    Pneumococcal Vaccine: Pediatrics (0 to 5 Years) and At-Risk Patients (6 to 64 Years)  Age 65    IPV IMMUNIZATION  Aged Out    MENINGITIS IMMUNIZATION  Aged Out

## 2023-02-24 LAB
DEPRECATED CALCIDIOL+CALCIFEROL SERPL-MC: 47 UG/L (ref 20–75)
HCV AB SERPL QL IA: NONREACTIVE
HIV 1+2 AB+HIV1 P24 AG SERPL QL IA: NONREACTIVE

## 2023-05-05 ASSESSMENT — ANXIETY QUESTIONNAIRES
1. FEELING NERVOUS, ANXIOUS, OR ON EDGE: NOT AT ALL
3. WORRYING TOO MUCH ABOUT DIFFERENT THINGS: NOT AT ALL
6. BECOMING EASILY ANNOYED OR IRRITABLE: SEVERAL DAYS
GAD7 TOTAL SCORE: 1
2. NOT BEING ABLE TO STOP OR CONTROL WORRYING: NOT AT ALL
GAD7 TOTAL SCORE: 1
7. FEELING AFRAID AS IF SOMETHING AWFUL MIGHT HAPPEN: NOT AT ALL
5. BEING SO RESTLESS THAT IT IS HARD TO SIT STILL: NOT AT ALL
IF YOU CHECKED OFF ANY PROBLEMS ON THIS QUESTIONNAIRE, HOW DIFFICULT HAVE THESE PROBLEMS MADE IT FOR YOU TO DO YOUR WORK, TAKE CARE OF THINGS AT HOME, OR GET ALONG WITH OTHER PEOPLE: NOT DIFFICULT AT ALL
7. FEELING AFRAID AS IF SOMETHING AWFUL MIGHT HAPPEN: NOT AT ALL
GAD7 TOTAL SCORE: 1
8. IF YOU CHECKED OFF ANY PROBLEMS, HOW DIFFICULT HAVE THESE MADE IT FOR YOU TO DO YOUR WORK, TAKE CARE OF THINGS AT HOME, OR GET ALONG WITH OTHER PEOPLE?: NOT DIFFICULT AT ALL
4. TROUBLE RELAXING: NOT AT ALL

## 2023-05-05 ASSESSMENT — PATIENT HEALTH QUESTIONNAIRE - PHQ9
SUM OF ALL RESPONSES TO PHQ QUESTIONS 1-9: 3
10. IF YOU CHECKED OFF ANY PROBLEMS, HOW DIFFICULT HAVE THESE PROBLEMS MADE IT FOR YOU TO DO YOUR WORK, TAKE CARE OF THINGS AT HOME, OR GET ALONG WITH OTHER PEOPLE: NOT DIFFICULT AT ALL
SUM OF ALL RESPONSES TO PHQ QUESTIONS 1-9: 3

## 2023-05-11 ENCOUNTER — VIRTUAL VISIT (OUTPATIENT)
Dept: FAMILY MEDICINE | Facility: CLINIC | Age: 38
End: 2023-05-11
Payer: COMMERCIAL

## 2023-05-11 DIAGNOSIS — F34.1 DYSTHYMIC DISORDER: Primary | ICD-10-CM

## 2023-05-11 PROCEDURE — 99214 OFFICE O/P EST MOD 30 MIN: CPT | Mod: VID | Performed by: FAMILY MEDICINE

## 2023-05-11 ASSESSMENT — ANXIETY QUESTIONNAIRES: GAD7 TOTAL SCORE: 1

## 2023-05-11 ASSESSMENT — PATIENT HEALTH QUESTIONNAIRE - PHQ9
SUM OF ALL RESPONSES TO PHQ QUESTIONS 1-9: 3
10. IF YOU CHECKED OFF ANY PROBLEMS, HOW DIFFICULT HAVE THESE PROBLEMS MADE IT FOR YOU TO DO YOUR WORK, TAKE CARE OF THINGS AT HOME, OR GET ALONG WITH OTHER PEOPLE: NOT DIFFICULT AT ALL

## 2023-05-11 NOTE — PROGRESS NOTES
Randee is a 37 year old who is being evaluated via a billable video visit.      How would you like to obtain your AVS? MyChart  If the video visit is dropped, the invitation should be resent by: Text to cell phone: [phone]  Will anyone else be joining your video visit? No          Assessment & Plan       ICD-10-CM    1. Depression With Anxiety  F34.1         Follow-up for your physical in February, sooner if needed.    I am glad you are doing well on the 150 mg of sertraline daily.    I glad you see a therapist routinely.    I am glad you are getting in physical activity.    Please check if you have a family history of adenomatous colon polyps or colon cancer.    Glad you see Dermatology for full body skin checks.    Since you are not on birth control recommend taking a prenatal vitamin daily or a multivitamin with at least 800 mcg of folic acid daily.  This will help prevent spina bifida if you do get pregnant.    If you do become pregnant would recommend stopping the a adapalene (Differin) topical and the metronidazole topical as they may not be safe in pregnancy.    If you do become pregnant I would have you stop the magnesium supplement and we do not want you on anything with any vitamin A in it or using any retinol products.    Clindamycin, vitamin D, vitamin B6 are all safe in pregnancy.    If you do become pregnant we would want to try to lower the dose of sertraline if possible.    Dr. Paul does provide prenatal care and would be happy to see you if you do become pregnant.           30 minutes spent by me on the date of the encounter doing chart review, history and exam, documentation and further activities per the note           Fatoumata Paul MD  [facility]    Jaron Jeff is a 37 year old, presenting for the following health issues:  Recheck Medication (DEPRESSION/ ANXIETY MED CHECK/ FOLLOW UP )        5/11/2023     4:00 PM   Additional Questions   Roomed by  YULI NULL   Accompanied by IOANA     History of Present Illness       Mental Health Follow-up:  Patient presents to follow-up on Depression & Anxiety.Patient's depression since last visit has been:  Good  The patient is not having other symptoms associated with depression.  Patient's anxiety since last visit has been:  Good  The patient is not having other symptoms associated with anxiety.  Any significant life events: other  Patient is not feeling anxious or having panic attacks.  Patient has no concerns about alcohol or drug use.She consumes 1 sweetened beverage(s) daily.She exercises with enough effort to increase her heart rate 30 to 60 minutes per day.  She exercises with enough effort to increase her heart rate 3 or less days per week.   She is taking medications regularly.    Today's PHQ-9         PHQ-9 Total Score: 3    PHQ-9 Q9 Thoughts of better off dead/self-harm past 2 weeks :   Not at all    How difficult have these problems made it for you to do your work, take care of things at home, or get along with other people: Not difficult at all  Today's LILIAM-7 Score: 1    Depression and anxiety:  Feels so much bettter.  In February at her physical sertraline was increased from 100 mg to 150 mg.  She feels she is able to tolerate life stressors much better.  She still very busy but much more manageable.  She does not have any thoughts of being better off dead and does contract against suicide and homicide.  Recent stressful event was when her spouse's therapist terminated his care.  It was very abrupt and unexpected.  It was very stressful for the both of them but they are handling it.  Randee's therapist was able to help find a therapist for her .  Her spouse is building up his business and is now doing this full-time since January 2023.  He was at the University doing a postdoc but now his doing his own business.  He is also on the city [employer].  They both started to walk outside together.  They do try  to eat healthy.  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  Possiblilty of publishing a book.  She has lived in her home for 13 years and her  moved in 11 years ago.  Found out last night that a friend is moving to [facility].  They may be moving as well but likely staying in the area.         Working on healthy diet and exercise.    She has had some infertility and not trying to become pregnant but not protecting against pregnancy.  It would be fine and happy if she became pregnant but not stressing about trying to get pregnant.  Not wishing to do any extra monitoring or testing.        She wants to discuss some basic health maintenance which we did.    Social:  Father had part of intestine removed long ago but does not think he had colon cancer or colon polyps.      Depression and Anxiety Follow-Up    How are you doing with your depression since your last visit? Improved     How are you doing with your anxiety since your last visit?  Improved     Are you having other symptoms that might be associated with depression or anxiety? No    Have you had a significant life event? OTHER: ONGOING JOB DEMANDS, CHANGE OF THERAPIST FOR SPOUSE     Do you have any concerns with your use of alcohol or other drugs? No    Social History     Tobacco Use     Smoking status: Never     Smokeless tobacco: Never         2/3/2022    11:37 AM 2/21/2023     6:19 PM 5/5/2023     3:53 AM   PHQ   PHQ-9 Total Score 5 15 3   Q9: Thoughts of better off dead/self-harm past 2 weeks Not at all Several days Not at all   F/U: Thoughts of suicide or self-harm  No    F/U: Safety concerns  No          9/9/2019    12:00 PM 2/1/2021    11:00 AM 5/5/2023     3:53 AM   LILIAM-7 SCORE   Total Score   1 (minimal anxiety)   Total Score 2 5 1         5/5/2023     3:53 AM   Last PHQ-9   1.  Little interest or pleasure in doing things 0   2.  Feeling down, depressed, or hopeless 0   3.  Trouble falling or staying asleep, or sleeping  too much 1   4.  Feeling tired or having little energy 1   5.  Poor appetite or overeating 1   6.  Feeling bad about yourself 0   7.  Trouble concentrating 0   8.  Moving slowly or restless 0   Q9: Thoughts of better off dead/self-harm past 2 weeks 0   PHQ-9 Total Score 3         5/5/2023     3:53 AM   LILIAM-7    1. Feeling nervous, anxious, or on edge 0   2. Not being able to stop or control worrying 0   3. Worrying too much about different things 0   4. Trouble relaxing 0   5. Being so restless that it is hard to sit still 0   6. Becoming easily annoyed or irritable 1   7. Feeling afraid, as if something awful might happen 0   LILIAM-7 Total Score 1   If you checked any problems, how difficult have they made it for you to do your work, take care of things at home, or get along with other people? Not difficult at all       Suicide Assessment Five-step Evaluation and Treatment (SAFE-T)      How many servings of fruits and vegetables do you eat daily?  4 or more    On average, how many sweetened beverages do you drink each day (Examples: soda, juice, sweet tea, etc.  Do NOT count diet or artificially sweetened beverages)?   2    How many days per week do you exercise enough to make your heart beat faster? 5    How many minutes a day do you exercise enough to make your heart beat faster? 30 - 60    How many days per week do you miss taking your medication? 0          Review of Systems         Objective           Vitals:  No vitals were obtained today due to virtual visit.    Physical Exam   GENERAL: Healthy, alert and no distress  PSYCH: Mentation appears normal, affect normal/bright, judgement and insight intact, normal speech and appearance well-groomed.    PHQ-9 was 15, now 3  LILIAM-7 was 5 now it is 1                Video-Visit Details    Type of service:  Video Visit     Originating Location (pt. Location): Home    Distant Location (provider location):  On-site  Platform used for Video Visit: Thomas

## 2023-05-11 NOTE — PATIENT INSTRUCTIONS
Follow-up for your physical in February, sooner if needed.    I am glad you are doing well on the 150 mg of sertraline daily.    I glad you see a therapist routinely.    I am glad you are getting in physical activity.    Please check if you have a family history of adenomatous colon polyps or colon cancer.    Glad you see Dermatology for full body skin checks.    Since you are not on birth control recommend taking a prenatal vitamin daily or a multivitamin with at least 800 mcg of folic acid daily.  This will help prevent spina bifida if you do get pregnant.    If you do become pregnant would recommend stopping the a adapalene (Differin) topical and the metronidazole topical as they may not be safe in pregnancy.    If you do become pregnant I would have you stop the magnesium supplement and we do not want you on anything with any vitamin A in it or using any retinol products.    Clindamycin, vitamin D, vitamin B6 are all safe in pregnancy.    If you do become pregnant we would want to try to lower the dose of sertraline if possible.    Dr. Paul does provide prenatal care and would be happy to see you if you do become pregnant.

## 2023-05-22 ENCOUNTER — TRANSFERRED RECORDS (OUTPATIENT)
Dept: HEALTH INFORMATION MANAGEMENT | Facility: CLINIC | Age: 38
End: 2023-05-22
Payer: COMMERCIAL

## 2023-06-05 ENCOUNTER — TRANSFERRED RECORDS (OUTPATIENT)
Dept: HEALTH INFORMATION MANAGEMENT | Facility: CLINIC | Age: 38
End: 2023-06-05
Payer: COMMERCIAL

## 2023-07-07 ENCOUNTER — TRANSFERRED RECORDS (OUTPATIENT)
Dept: HEALTH INFORMATION MANAGEMENT | Facility: CLINIC | Age: 38
End: 2023-07-07
Payer: COMMERCIAL

## 2023-08-13 DIAGNOSIS — F34.1 DYSTHYMIC DISORDER: ICD-10-CM

## 2023-08-13 RX ORDER — SERTRALINE HYDROCHLORIDE 100 MG/1
100 TABLET, FILM COATED ORAL DAILY
Qty: 90 TABLET | Refills: 1 | Status: SHIPPED | OUTPATIENT
Start: 2023-08-13 | End: 2024-02-09

## 2023-08-13 NOTE — TELEPHONE ENCOUNTER
"Routing refill request to provider for review/approval because:  No print out     Last Written Prescription Date:  2/23/23  Last Fill Quantity: 90,  # refills: 1   Last office visit provider:   5/11/23    Requested Prescriptions   Pending Prescriptions Disp Refills    sertraline (ZOLOFT) 100 MG tablet [Pharmacy Med Name: SERTRALINE 100MG TABLETS] 90 tablet 1     Sig: TAKE 1 TABLET(100 MG) BY MOUTH DAILY       SSRIs Protocol Passed - 8/13/2023  5:40 AM        Passed - PHQ-9 score less than 5 in past 6 months     Please review last PHQ-9 score.           Passed - Medication is active on med list        Passed - Patient is age 18 or older        Passed - No active pregnancy on record        Passed - No positive pregnancy test in last 12 months        Passed - Recent (6 mo) or future (30 days) visit within the authorizing provider's specialty     Patient had office visit in the last 6 months or has a visit in the next 30 days with authorizing provider or within the authorizing provider's specialty.  See \"Patient Info\" tab in inbasket, or \"Choose Columns\" in Meds & Orders section of the refill encounter.                 Carrie Moralez RN 08/13/23 7:04 AM  "

## 2023-11-30 ENCOUNTER — TRANSFERRED RECORDS (OUTPATIENT)
Dept: HEALTH INFORMATION MANAGEMENT | Facility: CLINIC | Age: 38
End: 2023-11-30
Payer: COMMERCIAL

## 2024-01-24 ENCOUNTER — PATIENT OUTREACH (OUTPATIENT)
Dept: CARE COORDINATION | Facility: CLINIC | Age: 39
End: 2024-01-24
Payer: COMMERCIAL

## 2024-01-25 ENCOUNTER — TRANSFERRED RECORDS (OUTPATIENT)
Dept: HEALTH INFORMATION MANAGEMENT | Facility: CLINIC | Age: 39
End: 2024-01-25

## 2024-02-07 ENCOUNTER — PATIENT OUTREACH (OUTPATIENT)
Dept: CARE COORDINATION | Facility: CLINIC | Age: 39
End: 2024-02-07
Payer: COMMERCIAL

## 2024-02-08 DIAGNOSIS — F34.1 DYSTHYMIC DISORDER: ICD-10-CM

## 2024-02-09 RX ORDER — SERTRALINE HYDROCHLORIDE 100 MG/1
100 TABLET, FILM COATED ORAL DAILY
Qty: 90 TABLET | Refills: 1 | Status: SHIPPED | OUTPATIENT
Start: 2024-02-09 | End: 2024-08-07

## 2024-03-18 ENCOUNTER — TRANSFERRED RECORDS (OUTPATIENT)
Dept: HEALTH INFORMATION MANAGEMENT | Facility: CLINIC | Age: 39
End: 2024-03-18
Payer: COMMERCIAL

## 2024-03-19 ENCOUNTER — TRANSFERRED RECORDS (OUTPATIENT)
Dept: HEALTH INFORMATION MANAGEMENT | Facility: CLINIC | Age: 39
End: 2024-03-19
Payer: COMMERCIAL

## 2024-03-28 SDOH — HEALTH STABILITY: PHYSICAL HEALTH: ON AVERAGE, HOW MANY MINUTES DO YOU ENGAGE IN EXERCISE AT THIS LEVEL?: 60 MIN

## 2024-03-28 SDOH — HEALTH STABILITY: PHYSICAL HEALTH: ON AVERAGE, HOW MANY DAYS PER WEEK DO YOU ENGAGE IN MODERATE TO STRENUOUS EXERCISE (LIKE A BRISK WALK)?: 3 DAYS

## 2024-03-28 ASSESSMENT — SOCIAL DETERMINANTS OF HEALTH (SDOH): HOW OFTEN DO YOU GET TOGETHER WITH FRIENDS OR RELATIVES?: THREE TIMES A WEEK

## 2024-04-04 ENCOUNTER — OFFICE VISIT (OUTPATIENT)
Dept: FAMILY MEDICINE | Facility: CLINIC | Age: 39
End: 2024-04-04
Payer: COMMERCIAL

## 2024-04-04 VITALS
WEIGHT: 193.8 LBS | RESPIRATION RATE: 16 BRPM | BODY MASS INDEX: 31.15 KG/M2 | SYSTOLIC BLOOD PRESSURE: 95 MMHG | HEART RATE: 76 BPM | OXYGEN SATURATION: 98 % | HEIGHT: 66 IN | DIASTOLIC BLOOD PRESSURE: 62 MMHG | TEMPERATURE: 98.5 F

## 2024-04-04 DIAGNOSIS — E66.811 CLASS 1 OBESITY WITHOUT SERIOUS COMORBIDITY WITH BODY MASS INDEX (BMI) OF 31.0 TO 31.9 IN ADULT, UNSPECIFIED OBESITY TYPE: ICD-10-CM

## 2024-04-04 DIAGNOSIS — M54.50 CHRONIC MIDLINE LOW BACK PAIN WITHOUT SCIATICA: ICD-10-CM

## 2024-04-04 DIAGNOSIS — Z48.02 VISIT FOR SUTURE REMOVAL: ICD-10-CM

## 2024-04-04 DIAGNOSIS — F34.1 DYSTHYMIC DISORDER: ICD-10-CM

## 2024-04-04 DIAGNOSIS — G89.29 CHRONIC MIDLINE LOW BACK PAIN WITHOUT SCIATICA: ICD-10-CM

## 2024-04-04 DIAGNOSIS — Z13.220 LIPID SCREENING: ICD-10-CM

## 2024-04-04 DIAGNOSIS — L71.9 ROSACEA: ICD-10-CM

## 2024-04-04 DIAGNOSIS — Z00.00 ROUTINE GENERAL MEDICAL EXAMINATION AT A HEALTH CARE FACILITY: Primary | ICD-10-CM

## 2024-04-04 LAB
ERYTHROCYTE [DISTWIDTH] IN BLOOD BY AUTOMATED COUNT: 12.9 % (ref 10–15)
HBA1C MFR BLD: 5.1 % (ref 0–5.6)
HCT VFR BLD AUTO: 40.2 % (ref 35–47)
HGB BLD-MCNC: 13.8 G/DL (ref 11.7–15.7)
MCH RBC QN AUTO: 30.3 PG (ref 26.5–33)
MCHC RBC AUTO-ENTMCNC: 34.3 G/DL (ref 31.5–36.5)
MCV RBC AUTO: 88 FL (ref 78–100)
PLATELET # BLD AUTO: 272 10E3/UL (ref 150–450)
RBC # BLD AUTO: 4.55 10E6/UL (ref 3.8–5.2)
WBC # BLD AUTO: 5.1 10E3/UL (ref 4–11)

## 2024-04-04 PROCEDURE — 36415 COLL VENOUS BLD VENIPUNCTURE: CPT | Performed by: FAMILY MEDICINE

## 2024-04-04 PROCEDURE — 80053 COMPREHEN METABOLIC PANEL: CPT | Performed by: FAMILY MEDICINE

## 2024-04-04 PROCEDURE — 85027 COMPLETE CBC AUTOMATED: CPT | Performed by: FAMILY MEDICINE

## 2024-04-04 PROCEDURE — 80061 LIPID PANEL: CPT | Performed by: FAMILY MEDICINE

## 2024-04-04 PROCEDURE — 99213 OFFICE O/P EST LOW 20 MIN: CPT | Mod: 25 | Performed by: FAMILY MEDICINE

## 2024-04-04 PROCEDURE — 84443 ASSAY THYROID STIM HORMONE: CPT | Performed by: FAMILY MEDICINE

## 2024-04-04 PROCEDURE — 83036 HEMOGLOBIN GLYCOSYLATED A1C: CPT | Performed by: FAMILY MEDICINE

## 2024-04-04 PROCEDURE — 99395 PREV VISIT EST AGE 18-39: CPT | Performed by: FAMILY MEDICINE

## 2024-04-04 ASSESSMENT — ANXIETY QUESTIONNAIRES
7. FEELING AFRAID AS IF SOMETHING AWFUL MIGHT HAPPEN: NOT AT ALL
7. FEELING AFRAID AS IF SOMETHING AWFUL MIGHT HAPPEN: NOT AT ALL
2. NOT BEING ABLE TO STOP OR CONTROL WORRYING: NOT AT ALL
5. BEING SO RESTLESS THAT IT IS HARD TO SIT STILL: NOT AT ALL
IF YOU CHECKED OFF ANY PROBLEMS ON THIS QUESTIONNAIRE, HOW DIFFICULT HAVE THESE PROBLEMS MADE IT FOR YOU TO DO YOUR WORK, TAKE CARE OF THINGS AT HOME, OR GET ALONG WITH OTHER PEOPLE: NOT DIFFICULT AT ALL
3. WORRYING TOO MUCH ABOUT DIFFERENT THINGS: NOT AT ALL
GAD7 TOTAL SCORE: 1
4. TROUBLE RELAXING: NOT AT ALL
8. IF YOU CHECKED OFF ANY PROBLEMS, HOW DIFFICULT HAVE THESE MADE IT FOR YOU TO DO YOUR WORK, TAKE CARE OF THINGS AT HOME, OR GET ALONG WITH OTHER PEOPLE?: NOT DIFFICULT AT ALL
6. BECOMING EASILY ANNOYED OR IRRITABLE: SEVERAL DAYS
GAD7 TOTAL SCORE: 1
1. FEELING NERVOUS, ANXIOUS, OR ON EDGE: NOT AT ALL
GAD7 TOTAL SCORE: 1

## 2024-04-04 ASSESSMENT — PATIENT HEALTH QUESTIONNAIRE - PHQ9
10. IF YOU CHECKED OFF ANY PROBLEMS, HOW DIFFICULT HAVE THESE PROBLEMS MADE IT FOR YOU TO DO YOUR WORK, TAKE CARE OF THINGS AT HOME, OR GET ALONG WITH OTHER PEOPLE: NOT DIFFICULT AT ALL
SUM OF ALL RESPONSES TO PHQ QUESTIONS 1-9: 1
SUM OF ALL RESPONSES TO PHQ QUESTIONS 1-9: 1

## 2024-04-04 NOTE — PROGRESS NOTES
Answers submitted by the patient for this visit:  Patient Health Questionnaire (Submitted on 4/4/2024)  If you checked off any problems, how difficult have these problems made it for you to do your work, take care of things at home, or get along with other people?: Not difficult at all  PHQ9 TOTAL SCORE: 1  LILIAM-7 (Submitted on 4/4/2024)  LILIAM 7 TOTAL SCORE: 1  Preventive Care Visit  [facility]  Priyanka Sibley MD, Family Medicine  Apr 4, 2024      1. Routine general medical examination at a health care facility  Randee presents for annual exam.  As far as healthcare maintenance, she had a normal Pap smear in 2021 so was due in 2026.    2. Depression With Anxiety  She feels like her depression and anxiety are under good control with her current dose of sertraline.    3. Rosacea  She was following with dermatology for her rosacea and just needs refills.  - metroNIDAZOLE (METROCREAM) 0.75 % external cream; Apply to face at bedtime  Dispense: 45 g; Refill: 3    4. Lipid screening    - CBC with platelets; Future  - Comprehensive metabolic panel; Future  - Lipid panel reflex to direct LDL Fasting; Future  - CBC with platelets  - Comprehensive metabolic panel  - Lipid panel reflex to direct LDL Fasting    5. Chronic midline low back pain without sciatica  She has been having some low back pain mainly right before and during her menses.  It does not seem to bother her at other times so I am wondering if this is may be related to endometriosis.  I did offer physical therapy and she will think about it.  Discussed scheduling anti-inflammatories right when she starts to notice this discomfort.  She does not want to try anything hormonal as they are possibly trying to start a family.  No red flag symptoms.    6. Class 1 obesity without serious comorbidity with body mass index (BMI) of 31.0 to 31.9 in adult, unspecified obesity type    - TSH with free T4 reflex; Future  - Hemoglobin A1c; Future  - TSH  with free T4 reflex  - Hemoglobin A1c    7. Visit for suture removal  She had a fairly large nevi removed on her anterior chest wall 2 weeks ago.  She is due to have her sutures removed so we did that today.  Please see procedure note below.  I did place some Steri-Strips.      Subjective   Randee is a 38 year old, presenting for the following:  Physical (Bilateral hip pain - intermittent for 6 months. Seems to happen around her period. Fasting. )        4/4/2024    10:27 AM   Additional Questions   Roomed by         Health Care Directive  Patient does not have a Health Care Directive or Living Will: Discussed advance care planning with patient; information given to patient to review.    WENDY Jeff comes in today for annual exam.  She is new to me today, normally sees Dr. Garcia.  She states she is been having what she states is hip pain but she points actually to her low back in the lumbar sacral area.  It tends to start right before her menses and then continue for that week.  It does not seem to bother her at other times.  If it radiates that radiates up her back, not down her legs.  She does have possibly endometriosis.  She has no history of fibroids and had thorough evaluations in the past for infertility.            3/28/2024   General Health   How would you rate your overall physical health? Good   Feel stress (tense, anxious, or unable to sleep) Only a little   (!) STRESS CONCERN      3/28/2024   Nutrition   Three or more servings of calcium each day? Yes   Diet: Regular (no restrictions)   How many servings of fruit and vegetables per day? 4 or more   How many sweetened beverages each day? 0-1         3/28/2024   Exercise   Days per week of moderate/strenous exercise 3 days   Average minutes spent exercising at this level 60 min         3/28/2024   Social Factors   Frequency of gathering with friends or relatives Three times a week   Worry food won't last until get money to buy more No   Food not last or  not have enough money for food? No   Do you have housing?  Yes   Are you worried about losing your housing? No   Lack of transportation? No   Unable to get utilities (heat,electricity)? No         3/28/2024   Dental   Dentist two times every year? Yes         3/28/2024   TB Screening   Were you born outside of the [place]? No       Today's PHQ-9 Score:       4/4/2024    10:25 AM   PHQ-9 SCORE   PHQ-9 Total Score MyChart 1 (Minimal depression)   PHQ-9 Total Score 1         3/28/2024   Substance Use   Alcohol more than 3/day or more than 7/wk No   Do you use any other substances recreationally? No     Social History     Tobacco Use    Smoking status: Never    Smokeless tobacco: Never   Vaping Use    Vaping Use: Never used             3/28/2024   Breast Cancer Screening   Family history of breast, colon, or ovarian cancer? No / Unknown              3/28/2024   STI Screening   New sexual partner(s) since last STI/HIV test? No     History of abnormal Pap smear: NO - age 30-65 PAP every 5 years with negative HPV co-testing recommended        Latest Ref Rng & Units 2/1/2021    12:14 PM 10/3/2018     5:16 PM   PAP / HPV   PAP Negative for squamous intraepithelial lesion or malignancy. Negative for squamous intraepithelial lesion or malignancy  Electronically signed by Josiane Woodson CT (ASCP) on 2/10/2021 at  2:08 PM    Negative for squamous intraepithelial lesion or malignancy  Electronically signed by Shona Doyle CT (ASCP) on 10/11/2018 at  2:57 PM      HPV 16 DNA NEG Negative  Negative    HPV 18 DNA NEG Negative  Negative    Other HR HPV NEG Negative  Negative            3/28/2024   Contraception/Family Planning   Questions about contraception or family planning No        Reviewed and updated as needed this visit by Provider                    Lab work is in process  Current Outpatient Medications   Medication Sig Dispense Refill    adapalene (DIFFERIN) 0.3 % external gel       clindamycin (CLEOCIN T) 1 % lotion  "[CLINDAMYCIN (CLEOCIN T) 1 % LOTION]       Magnesium Oxide 500 MG TABS       metroNIDAZOLE (METROCREAM) 0.75 % external cream Apply to face at bedtime 45 g 3    pyridOXINE (VITAMIN B6) 100 MG TABS       sertraline (ZOLOFT) 100 MG tablet TAKE 1 TABLET(100 MG) BY MOUTH DAILY 90 tablet 1    sertraline (ZOLOFT) 50 MG tablet TAKE 1 TABLET(50 MG) BY MOUTH DAILY 90 tablet 3    vitamin D3 (CHOLECALCIFEROL) 50 mcg (2000 units) tablet            Review of Systems  Constitutional, HEENT, cardiovascular, pulmonary, GI, , musculoskeletal, neuro, skin, endocrine and psych systems are negative, except as otherwise noted.     Objective    Exam  BP 95/62   Pulse 76   Temp 98.5  F (36.9  C)   Resp 16   Ht 1.676 m (5' 6\")   Wt 87.9 kg (193 lb 12.8 oz)   LMP 03/26/2024   SpO2 98%   BMI 31.28 kg/m     Estimated body mass index is 31.28 kg/m  as calculated from the following:    Height as of this encounter: 1.676 m (5' 6\").    Weight as of this encounter: 87.9 kg (193 lb 12.8 oz).    Physical Exam  GENERAL: alert and no distress  EYES: Eyes grossly normal to inspection, PERRL and conjunctivae and sclerae normal  HENT: ear canals and TM's normal, nose and mouth without ulcers or lesions  NECK: no adenopathy, no asymmetry, masses, or scars  RESP: lungs clear to auscultation - no rales, rhonchi or wheezes  CV: regular rate and rhythm, normal S1 S2, no S3 or S4, no murmur, click or rub, no peripheral edema  ABDOMEN: soft, nontender, no hepatosplenomegaly, no masses and bowel sounds normal  MS: no gross musculoskeletal defects noted, no edema  SKIN: Approximate 1 cm wound anterior chest, healing well.  NEURO: Normal strength and tone, mentation intact and speech normal  PSYCH: mentation appears normal, affect normal/bright    Procedure: After verbal consent, I removed 4 interrupted sutures from the anterior chest wall wound with a sharp iris scissors and a pickup.  I did place some Steri-Strips.    Signed Electronically by: Priyanka POLLARD" MD Maryjo

## 2024-04-05 LAB
ALBUMIN SERPL BCG-MCNC: 4.4 G/DL (ref 3.5–5.2)
ALP SERPL-CCNC: 57 U/L (ref 40–150)
ALT SERPL W P-5'-P-CCNC: 16 U/L (ref 0–50)
ANION GAP SERPL CALCULATED.3IONS-SCNC: 10 MMOL/L (ref 7–15)
AST SERPL W P-5'-P-CCNC: 14 U/L (ref 0–45)
BILIRUB SERPL-MCNC: 0.5 MG/DL
BUN SERPL-MCNC: 12.5 MG/DL (ref 6–20)
CALCIUM SERPL-MCNC: 9.1 MG/DL (ref 8.6–10)
CHLORIDE SERPL-SCNC: 105 MMOL/L (ref 98–107)
CHOLEST SERPL-MCNC: 150 MG/DL
CREAT SERPL-MCNC: 0.94 MG/DL (ref 0.51–0.95)
DEPRECATED HCO3 PLAS-SCNC: 24 MMOL/L (ref 22–29)
EGFRCR SERPLBLD CKD-EPI 2021: 80 ML/MIN/1.73M2
FASTING STATUS PATIENT QL REPORTED: NORMAL
GLUCOSE SERPL-MCNC: 91 MG/DL (ref 70–99)
HDLC SERPL-MCNC: 61 MG/DL
LDLC SERPL CALC-MCNC: 76 MG/DL
NONHDLC SERPL-MCNC: 89 MG/DL
POTASSIUM SERPL-SCNC: 4.4 MMOL/L (ref 3.4–5.3)
PROT SERPL-MCNC: 6.7 G/DL (ref 6.4–8.3)
SODIUM SERPL-SCNC: 139 MMOL/L (ref 135–145)
TRIGL SERPL-MCNC: 64 MG/DL
TSH SERPL DL<=0.005 MIU/L-ACNC: 1.97 UIU/ML (ref 0.3–4.2)

## 2024-05-30 ENCOUNTER — TRANSFERRED RECORDS (OUTPATIENT)
Dept: HEALTH INFORMATION MANAGEMENT | Facility: CLINIC | Age: 39
End: 2024-05-30
Payer: COMMERCIAL

## 2024-08-06 ENCOUNTER — NURSE TRIAGE (OUTPATIENT)
Dept: FAMILY MEDICINE | Facility: CLINIC | Age: 39
End: 2024-08-06
Payer: COMMERCIAL

## 2024-08-06 DIAGNOSIS — U07.1 INFECTION DUE TO 2019 NOVEL CORONAVIRUS: Primary | ICD-10-CM

## 2024-08-06 NOTE — TELEPHONE ENCOUNTER
RN COVID TREATMENT VISIT  08/06/24      The patient has been triaged and does not require a higher level of care.    Kimberley Mendoza  39 year old  Current weight? 190 lb    Has the patient been seen by a primary care provider at an [facility] or [facility] within the past two years? Yes.   Have you been in close proximity to/do you have a known exposure to a person with a confirmed case of influenza? No.     General treatment eligibility:  Date of positive COVID test (PCR or at home)?  8/6/24    Are you or have you been hospitalized for this COVID-19 infection? No.   Have you received monoclonal antibodies or antiviral treatment for COVID-19 since this positive test? No.   Do you have any of the following conditions that place you at risk of being very sick from COVID-19?   - Mental health disorders including mood disorders, depression, schizophrenia spectrum disorders   - Overweight or Obesity (BMI >85th percentile or BMI 25 or higher)  Yes, patient has at least one high risk condition as noted above.     Current COVID symptoms:   - fever or chills  - cough  - fatigue  - muscle or body aches  - sore throat  - congestion or runny nose  Yes. Patient has at least one symptom as selected.     How many days since symptoms started? 5 days or less. Established patient, 12 years or older weighing at least 88.2 lbs, who has symptoms that started in the past 5 days, has not been hospitalized nor received treatment already, and is at risk for being very sick from COVID-19.     Treatment eligibility by RN:  Are you currently pregnant or nursing? No  Do you have a clinically significant hypersensitivity to nirmatrelvir or ritonavir, or toxic epidermal necrolysis (TEN) or Thompson-Dallin Syndrome? No  Do you have a history of hepatitis, any hepatic impairment on the Problem List (such as Child-Gray Class C, cirrhosis, fatty liver disease, alcoholic liver disease), or was the last liver lab  (hepatic panel, ALT, AST, ALK Phos, bilirubin) elevated in the past 6 months? No  Do you have any history of severe renal impairment (eGFR < 30mL/min)? No    Is patient eligible to continue? Yes, patient meets all eligibility requirements for the RN COVID treatment (as denoted by all no responses above).     Current Outpatient Medications   Medication Sig Dispense Refill    adapalene (DIFFERIN) 0.3 % external gel       clindamycin (CLEOCIN T) 1 % lotion [CLINDAMYCIN (CLEOCIN T) 1 % LOTION]       Magnesium Oxide 500 MG TABS       metroNIDAZOLE (METROCREAM) 0.75 % external cream Apply to face at bedtime 45 g 3    pyridOXINE (VITAMIN B6) 100 MG TABS       sertraline (ZOLOFT) 100 MG tablet TAKE 1 TABLET(100 MG) BY MOUTH DAILY 90 tablet 1    sertraline (ZOLOFT) 50 MG tablet TAKE 1 TABLET(50 MG) BY MOUTH DAILY 90 tablet 3    vitamin D3 (CHOLECALCIFEROL) 50 mcg (2000 units) tablet          Medications from List 1 of the standing order (on medications that exclude the use of Paxlovid) that patient is taking: NONE. Is patient taking [place]'s Wort? No  Is patient taking Jennifer's Wort or any meds from List 1? No.   Medications from List 2 of the standing order (on meds that provider needs to adjust) that patient is taking: NONE. Is patient on any of the meds from List 2? No.   Medications from List 3 of standing order (on meds that a RN needs to adjust) that patient is taking: NONE. Is patient on any meds from List 3? No.     Paxlovid has an approximate 90% reduction in hospitalization. Paxlovid can possibly cause altered sense of taste, diarrhea (loose, watery stools), high blood pressure, muscle aches.     Would patient like a Paxlovid prescription?   Yes.   Lab Results   Component Value Date    GFRESTIMATED 80 04/04/2024       Was last eGFR reduced? No, eGFR 60 or greater/ No Result on record. Patient can receive the normal renal function dose. Paxlovid Rx sent to [facility]   [facility]  [place]    Temporary change to home medications: None    All medication adjustments (holds, etc) were discussed with the patient and patient was asked to repeat back (teachback) their med adjustment.  Did patient understand med adjustment? Yes, patient repeated back and understood correctly.        Reviewed the following instructions with the patient:    Paxlovid (nimatrelvir and ritonavir)    How it works  Two medicines (nirmatrelvir and ritonavir) are taken together. They stop the virus from growing. Less amount of virus is easier for your body to fight.    How to take  Medicine comes in a daily container with both medicine tablets. Take by mouth twice daily (once in the morning, once at night) for 5 days.  The number of tablets to take varies by patient.  Don't chew or break capsules. Swallow whole.    When to take  Take as soon as possible after positive COVID-19 test result, and within 5 days of your first symptoms.    Possible side effects  Can cause altered sense of taste, diarrhea (loose, watery stools), high blood pressure, muscle aches.    Lina Rodriguez RN     Reason for Disposition   HIGH RISK patient (e.g., weak immune system, age > 64 years, obesity with BMI of 30 or higher, pregnant, chronic lung disease or other chronic medical condition) and COVID symptoms (e.g., cough, fever)  (Exceptions: Already seen by doctor or NP/PA and no new or worsening symptoms.)    Additional Information   Negative: SEVERE difficulty breathing (e.g., struggling for each breath, speaks in single words)   Negative: Difficult to awaken or acting confused (e.g., disoriented, slurred speech)   Negative: Bluish (or gray) lips or face now   Negative: Shock suspected (e.g., cold/pale/clammy skin, too weak to stand, low BP, rapid pulse)   Negative: Sounds like a life-threatening emergency to the triager   Negative: Diagnosed or suspected COVID-19 and symptoms lasting 3 or more weeks   Negative: COVID-19 exposure and no symptoms    Negative: COVID-19 vaccine reaction suspected (e.g., fever, headache, muscle aches) occurring 1 to 3 days after getting vaccine   Negative: COVID-19 vaccine, questions about   Negative: Lives with someone known to have influenza (flu test positive) and flu-like symptoms (e.g., cough, runny nose, sore throat, SOB; with or without fever)   Negative: Possible COVID-19 symptoms and triager concerned about severity of symptoms or other causes   Negative: COVID-19 and breastfeeding, questions about   Negative: SEVERE or constant chest pain or pressure  (Exception: Mild central chest pain, present only when coughing.)   Negative: MODERATE difficulty breathing (e.g., speaks in phrases, SOB even at rest, pulse 100-120)   Negative: Headache and stiff neck (can't touch chin to chest)   Negative: Oxygen level (e.g., pulse oximetry) 90% or lower   Negative: Chest pain or pressure  (Exception: MILD central chest pain, present only when coughing.)   Negative: Drinking very little and dehydration suspected (e.g., no urine > 12 hours, very dry mouth, very lightheaded)   Negative: Patient sounds very sick or weak to the triager   Negative: MILD difficulty breathing (e.g., minimal/no SOB at rest, SOB with walking, pulse <100)   Negative: Fever > 103 F (39.4 C)   Negative: Fever > 101 F (38.3 C) and over 60 years of age   Negative: Fever > 100.0 F (37.8 C) and bedridden (e.g., CVA, chronic illness, recovering from surgery)    Protocols used: Coronavirus (COVID-19) Diagnosed or [id number]

## 2024-08-07 DIAGNOSIS — F34.1 DYSTHYMIC DISORDER: ICD-10-CM

## 2024-08-07 RX ORDER — SERTRALINE HYDROCHLORIDE 100 MG/1
100 TABLET, FILM COATED ORAL DAILY
Qty: 90 TABLET | Refills: 1 | Status: SHIPPED | OUTPATIENT
Start: 2024-08-07

## 2024-12-11 ENCOUNTER — TRANSFERRED RECORDS (OUTPATIENT)
Dept: HEALTH INFORMATION MANAGEMENT | Facility: CLINIC | Age: 39
End: 2024-12-11
Payer: COMMERCIAL

## 2025-02-03 DIAGNOSIS — F34.1 DYSTHYMIC DISORDER: ICD-10-CM

## 2025-02-03 RX ORDER — SERTRALINE HYDROCHLORIDE 100 MG/1
100 TABLET, FILM COATED ORAL DAILY
Qty: 90 TABLET | Refills: 1 | Status: SHIPPED | OUTPATIENT
Start: 2025-02-03

## 2025-03-28 ENCOUNTER — TRANSFERRED RECORDS (OUTPATIENT)
Dept: HEALTH INFORMATION MANAGEMENT | Facility: CLINIC | Age: 40
End: 2025-03-28
Payer: COMMERCIAL

## 2025-04-16 SDOH — HEALTH STABILITY: PHYSICAL HEALTH: ON AVERAGE, HOW MANY MINUTES DO YOU ENGAGE IN EXERCISE AT THIS LEVEL?: 60 MIN

## 2025-04-16 SDOH — HEALTH STABILITY: PHYSICAL HEALTH: ON AVERAGE, HOW MANY DAYS PER WEEK DO YOU ENGAGE IN MODERATE TO STRENUOUS EXERCISE (LIKE A BRISK WALK)?: 4 DAYS

## 2025-04-16 ASSESSMENT — PATIENT HEALTH QUESTIONNAIRE - PHQ9
10. IF YOU CHECKED OFF ANY PROBLEMS, HOW DIFFICULT HAVE THESE PROBLEMS MADE IT FOR YOU TO DO YOUR WORK, TAKE CARE OF THINGS AT HOME, OR GET ALONG WITH OTHER PEOPLE: NOT DIFFICULT AT ALL
SUM OF ALL RESPONSES TO PHQ QUESTIONS 1-9: 5
SUM OF ALL RESPONSES TO PHQ QUESTIONS 1-9: 5

## 2025-04-16 ASSESSMENT — SOCIAL DETERMINANTS OF HEALTH (SDOH): HOW OFTEN DO YOU GET TOGETHER WITH FRIENDS OR RELATIVES?: MORE THAN THREE TIMES A WEEK

## 2025-04-17 ENCOUNTER — OFFICE VISIT (OUTPATIENT)
Dept: FAMILY MEDICINE | Facility: CLINIC | Age: 40
End: 2025-04-17
Payer: COMMERCIAL

## 2025-04-17 VITALS
SYSTOLIC BLOOD PRESSURE: 104 MMHG | RESPIRATION RATE: 16 BRPM | DIASTOLIC BLOOD PRESSURE: 70 MMHG | TEMPERATURE: 98 F | HEIGHT: 66 IN | HEART RATE: 82 BPM | BODY MASS INDEX: 31.4 KG/M2 | OXYGEN SATURATION: 95 % | WEIGHT: 195.4 LBS

## 2025-04-17 DIAGNOSIS — Z00.00 ENCOUNTER FOR PREVENTATIVE ADULT HEALTH CARE EXAMINATION: Primary | ICD-10-CM

## 2025-04-17 DIAGNOSIS — F34.1 DYSTHYMIC DISORDER: ICD-10-CM

## 2025-04-17 DIAGNOSIS — N92.1 PROLONGED MENSTRUAL CYCLE: ICD-10-CM

## 2025-04-17 LAB
ERYTHROCYTE [DISTWIDTH] IN BLOOD BY AUTOMATED COUNT: 12.9 % (ref 10–15)
EST. AVERAGE GLUCOSE BLD GHB EST-MCNC: 103 MG/DL
HBA1C MFR BLD: 5.2 % (ref 0–5.6)
HCT VFR BLD AUTO: 40.7 % (ref 35–47)
HGB BLD-MCNC: 13.6 G/DL (ref 11.7–15.7)
MCH RBC QN AUTO: 29.8 PG (ref 26.5–33)
MCHC RBC AUTO-ENTMCNC: 33.4 G/DL (ref 31.5–36.5)
MCV RBC AUTO: 89 FL (ref 78–100)
PLATELET # BLD AUTO: 295 10E3/UL (ref 150–450)
RBC # BLD AUTO: 4.56 10E6/UL (ref 3.8–5.2)
WBC # BLD AUTO: 8.2 10E3/UL (ref 4–11)

## 2025-04-17 NOTE — PROGRESS NOTES
Preventive Care Visit  [facility]  Fatoumata Paul MD, Family Medicine  Apr 17, 2025      Assessment & Plan       ICD-10-CM    1. Encounter for preventative adult health care examination  Z00.00 Lipid panel reflex to direct LDL Non-fasting     Hemoglobin A1c     Lipid panel reflex to direct LDL Non-fasting     Hemoglobin A1c      2. Depression With Anxiety  F34.1 Vitamin D Deficiency     Vitamin B12     Vitamin B6     TSH with free T4 reflex     Comprehensive metabolic panel (BMP + Alb, Alk Phos, ALT, AST, Total. Bili, TP)     CBC with platelets     Vitamin D Deficiency     Vitamin B12     Vitamin B6     TSH with free T4 reflex     Comprehensive metabolic panel (BMP + Alb, Alk Phos, ALT, AST, Total. Bili, TP)     CBC with platelets      3. Prolonged menstrual cycle  N92.1 Prolactin     US Pelvic Complete with Transvaginal     Prolactin        We would like a copy of your living will / health care directive please.    Mammogram should start at age 40 but if your insurance will cover baseline now that is not a bad idea.  You can certainly let me know if you would like me to order a mammogram now.    Colonoscopy is due at age 46 for screening but would be due at age 40 if any family history of colon cancer or colon polyps.    We will get the pathology from your biopsy on the chest area from December at [facility] dermatology.    When you see dermatology in May please show them the repigmentation on the skin lesion because of that were a precancerous mole they want you might want to do more of a biopsy.    Then a full-body skin check is due in December.    I am glad you are focusing on mental health and seeing a counselor and you are exercising.    Ordering prolactin with your prolonged menses.  Also ordering a pelvic ultrasound.    The longitudinal plan of care for the diagnosis(es)/condition(s) as documented were addressed during this visit. Due to the added complexity in care, I will  "continue to support Randee in the subsequent management and with ongoing continuity of care.  Helping manage her prolonged menses and depression.    Patient has been advised of split billing requirements and indicates understanding: Yes        BMI  Estimated body mass index is 31.54 kg/m  as calculated from the following:    Height as of this encounter: 1.676 m (5' 6\").    Weight as of this encounter: 88.6 kg (195 lb 6.4 oz).   Weight management plan: Discussed healthy diet and exercise guidelines    Counseling  Appropriate preventive services were addressed with this patient via screening, questionnaire, or discussion as appropriate for fall prevention, nutrition, physical activity, Tobacco-use cessation, social engagement, weight loss and cognition.  Checklist reviewing preventive services available has been given to the patient.  Reviewed patient's diet, addressing concerns and/or questions.   She is at risk for psychosocial distress and has been provided with information to reduce risk.   The patient's PHQ-9 score is consistent with mild depression. She was provided with information regarding depression.           Subjective   Randee is a 39 year old, presenting for the following:  Physical (Physical ) and Derm Problem (Patient states that she saw derm and they shaved a spot on her chest, would just like it examined to see if that is something she needs to go back to derm for. )        4/17/2025     2:07 PM   Additional Questions   Roomed by Geraldine MUNROE CMA          HPI     Mood:  Stress at home.  Spouse own his own business and hard to find work.  She is in counseling and they started couples therapy.  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      Skin lesion:  Had biopsy at Derm and has noticed at the base of it every pigmented area.    Norovirus:  Broke out with rash on her face.  Using Desonide on face.    Periods:  Spot for 10 days on and off, not every day before menses, then medium flow for 3-5 then light for last 2 days " flow.  So she is bleeding at least 2 weeks out of the month.    Social:  Her brother in law was suicidal and came to stay with time for a while.  He is in therapy.  He had PTSD from working in healthcare during Covid. He also had long Covid.  After the visit she told one of our medical assistants that there is a family history of colon polyps but did not say who that was.      Health Maintenance   Topic Date Due    ANNUAL REVIEW OF HM ORDERS  Today    YEARLY PREVENTIVE VISIT  Today    PHQ-9  Today    HPV TEST  Next year    PAP  Next year    DIABETES SCREENING  Today    ADVANCE CARE PLANNING  We would like a copy please    DTAP/TDAP/TD IMMUNIZATION (4 - Td or Tdap) 02/03/2032    ZOSTER IMMUNIZATION (1 of 2) Age 51    HEPATITIS C SCREENING  Completed    HIV SCREENING  Completed    DEPRESSION ACTION PLAN  Completed    INFLUENZA VACCINE  Completed    HPV IMMUNIZATION  Completed    HEPATITIS B IMMUNIZATION  Completed    COVID-19 Vaccine  Completed    Pneumococcal Vaccine: Pediatrics (0 to 5 Years) and At-Risk Patients (6 to 49 Years)  Age 50    MENINGITIS IMMUNIZATION  Aged Out       Advance Care Planning    Patient states has Health Care Directive and will send to [employer].        4/16/2025   General Health   How would you rate your overall physical health? Good   Feel stress (tense, anxious, or unable to sleep) To some extent   (!) STRESS CONCERN      4/16/2025   Nutrition   Three or more servings of calcium each day? Yes   Diet: Regular (no restrictions)   How many servings of fruit and vegetables per day? (!) 2-3   How many sweetened beverages each day? 0-1         4/16/2025   Exercise   Days per week of moderate/strenous exercise 4 days   Average minutes spent exercising at this level 60 min         4/16/2025   Social Factors   Frequency of gathering with friends or relatives More than three times a week   Worry food won't last until get money to buy more No   Food not last or not have enough money for food?  No   Do you have housing? (Housing is defined as stable permanent housing and does not include staying ouside in a car, in a tent, in an abandoned building, in an overnight shelter, or couch-surfing.) Yes   Are you worried about losing your housing? Yes   Lack of transportation? No   Unable to get utilities (heat,electricity)? No   Want help with housing or utility concern? No   (!) HOUSING CONCERN PRESENT      4/16/2025   Dental   Dentist two times every year? Yes       Today's PHQ-9 Score:       4/16/2025    12:13 PM   PHQ-9 SCORE   PHQ-9 Total Score MyChart 5 (Mild depression)   PHQ-9 Total Score 5        Patient-reported         4/16/2025   Substance Use   Alcohol more than 3/day or more than 7/wk No   Do you use any other substances recreationally? (!) CANNABIS PRODUCTS     Social History     Tobacco Use    Smoking status: Never    Smokeless tobacco: Never   Vaping Use    Vaping status: Never Used   Substance Use Topics    Alcohol use: Yes     Alcohol/week: 0.8 - 2.5 standard drinks of alcohol     Comment: Social    Drug use: Not Currently     Types: Marijuana     Comment: Tried socially in edible form          Mammogram Screening - Patient under 40 years of age: Routine Mammogram Screening not recommended.         4/16/2025   STI Screening   New sexual partner(s) since last STI/HIV test? No     History of abnormal Pap smear: see below        Latest Ref Rng & Units 2/1/2021    12:14 PM 10/3/2018     5:16 PM   PAP / HPV   PAP Negative for squamous intraepithelial lesion or malignancy. Negative for squamous intraepithelial lesion or malignancy  Electronically signed by Josiane Woodson CT (ASCP) on 2/10/2021 at  2:08 PM    Negative for squamous intraepithelial lesion or malignancy  Electronically signed by Shona Doyle CT (ASCP) on 10/11/2018 at  2:57 PM      HPV 16 DNA NEG Negative  Negative    HPV 18 DNA NEG Negative  Negative    Other HR HPV NEG Negative  Negative            4/16/2025  "  Contraception/Family Planning   Questions about contraception or family planning No        Reviewed and updated as needed this visit by Provider     Meds                         Objective    Exam  /70 (BP Location: Left arm, Patient Position: Sitting, Cuff Size: Adult Large)   Pulse 82   Temp 98  F (36.7  C) (Oral)   Resp 16   Ht 1.676 m (5' 6\")   Wt 88.6 kg (195 lb 6.4 oz)   LMP 04/17/2025 (Exact Date)   SpO2 95%   BMI 31.54 kg/m     Estimated body mass index is 31.54 kg/m  as calculated from the following:    Height as of this encounter: 1.676 m (5' 6\").    Weight as of this encounter: 88.6 kg (195 lb 6.4 oz).    Physical Exam  GENERAL: alert and no distress  EYES: Eyes grossly normal to inspection, PERRL and conjunctivae and sclerae normal  HENT: ear canals and TM's normal, nose and mouth without ulcers or lesions  NECK: no adenopathy, no asymmetry, masses, or scars  RESP: lungs clear to auscultation - no rales, rhonchi or wheezes  BREAST: normal without masses, tenderness or nipple discharge and no palpable axillary masses or adenopathy  CV: regular rate and rhythm, normal S1 S2, no S3 or S4, no murmur, click or rub, no peripheral edema  ABDOMEN: soft, nontender, no hepatosplenomegaly, no masses and bowel sounds normal  MS: no gross musculoskeletal defects noted, no edema  SKIN: there is a scar on her chest that has a little brown pigmented area in the center, full-body skin exam not performed  NEURO: Normal strength and tone, mentation intact and speech normal  PSYCH: mentation appears normal, affect normal/bright        Signed Electronically by: Fatoumata Paul MD    Answers submitted by the patient for this visit:  Patient Health Questionnaire (Submitted on 4/16/2025)  If you checked off any problems, how difficult have these problems made it for you to do your work, take care of things at home, or get along with other people?: Not difficult at all  PHQ9 TOTAL SCORE: 5    "

## 2025-04-17 NOTE — PATIENT INSTRUCTIONS
We would like a copy of your living will / health care directive please.    Mammogram should start at age 40 but if your insurance will cover baseline now that is not a bad idea.  You can certainly let me know if you would like me to order a mammogram now.    Colonoscopy is due at age 45 for screening but would be due at age 40 if any family history of colon cancer or colon polyps.    We will get the pathology from your biopsy on the chest area from December at [facility] dermatology.    When you see dermatology in May please show them the repigmentation on the skin lesion because of that were a precancerous mole they want you might want to do more of a biopsy.    Then a full-body skin check is due in December.    I am glad you are focusing on mental health and seeing a counselor and you are exercising.    Ordering prolactin with your prolonged menses.  Also ordering a pelvic ultrasound.      Health Maintenance   Topic Date Due    ANNUAL REVIEW OF HM ORDERS  Today    YEARLY PREVENTIVE VISIT  Today    PHQ-9  Today    HPV TEST  Next year    PAP  Next year    DIABETES SCREENING  Today    ADVANCE CARE PLANNING  We would like a copy please    DTAP/TDAP/TD IMMUNIZATION (4 - Td or Tdap) 02/03/2032    ZOSTER IMMUNIZATION (1 of 2) Age 50    HEPATITIS C SCREENING  Completed    HIV SCREENING  Completed    DEPRESSION ACTION PLAN  Completed    INFLUENZA VACCINE  Completed    HPV IMMUNIZATION  Completed    HEPATITIS B IMMUNIZATION  Completed    COVID-19 Vaccine  Completed    Pneumococcal Vaccine: Pediatrics (0 to 5 Years) and At-Risk Patients (6 to 49 Years)  Age 50    MENINGITIS IMMUNIZATION  Aged Out

## 2025-04-20 LAB — PYRIDOXAL PHOS SERPL-SCNC: 81.4 NMOL/L

## 2025-04-23 ENCOUNTER — HOSPITAL ENCOUNTER (OUTPATIENT)
Dept: ULTRASOUND IMAGING | Facility: HOSPITAL | Age: 40
Discharge: HOME | End: 2025-04-23
Attending: FAMILY MEDICINE
Payer: COMMERCIAL

## 2025-04-23 DIAGNOSIS — N92.1 PROLONGED MENSTRUAL CYCLE: ICD-10-CM

## 2025-04-23 PROCEDURE — 76830 TRANSVAGINAL US NON-OB: CPT

## 2025-04-23 PROCEDURE — 76856 US EXAM PELVIC COMPLETE: CPT

## 2025-05-07 ENCOUNTER — TRANSFERRED RECORDS (OUTPATIENT)
Dept: HEALTH INFORMATION MANAGEMENT | Facility: CLINIC | Age: 40
End: 2025-05-07
Payer: COMMERCIAL

## 2025-05-12 ENCOUNTER — MYC MEDICAL ADVICE (OUTPATIENT)
Dept: FAMILY MEDICINE | Facility: CLINIC | Age: 40
End: 2025-05-12
Payer: COMMERCIAL

## 2025-05-12 DIAGNOSIS — Z30.09 BIRTH CONTROL COUNSELING: ICD-10-CM

## 2025-05-12 DIAGNOSIS — Z12.31 VISIT FOR SCREENING MAMMOGRAM: Primary | ICD-10-CM

## 2025-05-15 ENCOUNTER — PATIENT OUTREACH (OUTPATIENT)
Dept: CARE COORDINATION | Facility: CLINIC | Age: 40
End: 2025-05-15
Payer: COMMERCIAL

## 2025-05-15 RX ORDER — ETONOGESTREL AND ETHINYL ESTRADIOL VAGINAL RING .015; .12 MG/D; MG/D
RING VAGINAL
Qty: 3 EACH | Refills: 3 | Status: SHIPPED | OUTPATIENT
Start: 2025-05-15

## 2025-05-21 ENCOUNTER — ANCILLARY PROCEDURE (OUTPATIENT)
Dept: MAMMOGRAPHY | Facility: CLINIC | Age: 40
End: 2025-05-21
Attending: FAMILY MEDICINE
Payer: COMMERCIAL

## 2025-05-21 DIAGNOSIS — Z12.31 VISIT FOR SCREENING MAMMOGRAM: ICD-10-CM

## 2025-05-21 PROCEDURE — 77063 BREAST TOMOSYNTHESIS BI: CPT

## 2025-05-22 ENCOUNTER — ANCILLARY PROCEDURE (OUTPATIENT)
Dept: MAMMOGRAPHY | Facility: CLINIC | Age: 40
End: 2025-05-22
Attending: FAMILY MEDICINE
Payer: COMMERCIAL

## 2025-05-22 DIAGNOSIS — R92.8 ABNORMAL MAMMOGRAM: ICD-10-CM

## 2025-05-22 PROCEDURE — 76642 ULTRASOUND BREAST LIMITED: CPT | Mod: LT

## 2025-08-06 DIAGNOSIS — F34.1 DYSTHYMIC DISORDER: ICD-10-CM

## 2025-08-06 RX ORDER — SERTRALINE HYDROCHLORIDE 100 MG/1
100 TABLET, FILM COATED ORAL DAILY
Qty: 90 TABLET | Refills: 1 | Status: SHIPPED | OUTPATIENT
Start: 2025-08-06